# Patient Record
Sex: MALE | Race: BLACK OR AFRICAN AMERICAN | Employment: OTHER | URBAN - METROPOLITAN AREA
[De-identification: names, ages, dates, MRNs, and addresses within clinical notes are randomized per-mention and may not be internally consistent; named-entity substitution may affect disease eponyms.]

---

## 2022-02-10 ENCOUNTER — OFFICE VISIT (OUTPATIENT)
Dept: FAMILY MEDICINE CLINIC | Age: 42
End: 2022-02-10
Payer: OTHER GOVERNMENT

## 2022-02-10 VITALS
HEIGHT: 74 IN | TEMPERATURE: 97.1 F | OXYGEN SATURATION: 97 % | BODY MASS INDEX: 36.37 KG/M2 | WEIGHT: 283.4 LBS | HEART RATE: 102 BPM | DIASTOLIC BLOOD PRESSURE: 78 MMHG | SYSTOLIC BLOOD PRESSURE: 100 MMHG

## 2022-02-10 DIAGNOSIS — Z11.59 ENCOUNTER FOR HEPATITIS C SCREENING TEST FOR LOW RISK PATIENT: ICD-10-CM

## 2022-02-10 DIAGNOSIS — N52.9 ERECTILE DYSFUNCTION, UNSPECIFIED ERECTILE DYSFUNCTION TYPE: ICD-10-CM

## 2022-02-10 DIAGNOSIS — Z00.00 ENCOUNTER FOR WELLNESS EXAMINATION IN ADULT: ICD-10-CM

## 2022-02-10 DIAGNOSIS — Z13.220 SCREENING CHOLESTEROL LEVEL: Primary | ICD-10-CM

## 2022-02-10 DIAGNOSIS — Z83.3 FAMILY HISTORY OF DIABETES MELLITUS: ICD-10-CM

## 2022-02-10 DIAGNOSIS — E66.09 CLASS 2 OBESITY DUE TO EXCESS CALORIES WITHOUT SERIOUS COMORBIDITY WITH BODY MASS INDEX (BMI) OF 36.0 TO 36.9 IN ADULT: ICD-10-CM

## 2022-02-10 PROCEDURE — 99386 PREV VISIT NEW AGE 40-64: CPT | Performed by: NURSE PRACTITIONER

## 2022-02-10 RX ORDER — SILDENAFIL 100 MG/1
TABLET, FILM COATED ORAL
Qty: 30 TABLET | Refills: 1 | Status: SHIPPED | OUTPATIENT
Start: 2022-02-10 | End: 2022-02-25 | Stop reason: SDUPTHER

## 2022-02-10 RX ORDER — AMITRIPTYLINE HYDROCHLORIDE 10 MG/1
TABLET, FILM COATED ORAL
COMMUNITY
End: 2022-02-10 | Stop reason: SDUPTHER

## 2022-02-10 RX ORDER — AMITRIPTYLINE HYDROCHLORIDE 10 MG/1
10 TABLET, FILM COATED ORAL
Qty: 180 TABLET | Refills: 1 | Status: SHIPPED | OUTPATIENT
Start: 2022-02-10

## 2022-02-10 RX ORDER — SILDENAFIL 100 MG/1
TABLET, FILM COATED ORAL
COMMUNITY
End: 2022-02-10 | Stop reason: SDUPTHER

## 2022-02-10 NOTE — PROGRESS NOTES
No chief complaint on file. 1. Have you been to the ER, urgent care clinic since your last visit? Hospitalized since your last visit? No    2. Have you seen or consulted any other health care providers outside of the [address] [place] [address] since your last visit? Include any pap smears or colon screening.  No   Visit Vitals  /78 (BP 1 Location: Left upper arm, BP Patient Position: Sitting, BP Cuff Size: Adult)   Pulse (!) 102   Temp 97.1 °F (36.2 °C) (Temporal)   Ht 6' 1.8\" (1.875 m)   Wt 283 lb 6.4 oz (128.5 kg)   SpO2 97%   BMI 36.58 kg/m²         3 most recent PHQ Screens 2/10/2022   Little interest or pleasure in doing things Not at all   Feeling down, depressed, irritable, or hopeless Not at all   Total Score PHQ 2 0

## 2022-02-11 LAB
ALBUMIN SERPL-MCNC: 4.3 G/DL (ref 4–5)
ALBUMIN/GLOB SERPL: 1.7 {RATIO} (ref 1.2–2.2)
ALP SERPL-CCNC: 63 IU/L (ref 44–121)
ALT SERPL-CCNC: 22 IU/L (ref 0–44)
AST SERPL-CCNC: 20 IU/L (ref 0–40)
BASOPHILS # BLD AUTO: 0.1 X10E3/UL (ref 0–0.2)
BASOPHILS NFR BLD AUTO: 1 %
BILIRUB SERPL-MCNC: 0.3 MG/DL (ref 0–1.2)
BUN SERPL-MCNC: 11 MG/DL (ref 6–24)
BUN/CREAT SERPL: 10 (ref 9–20)
CALCIUM SERPL-MCNC: 9.5 MG/DL (ref 8.7–10.2)
CHLORIDE SERPL-SCNC: 104 MMOL/L (ref 96–106)
CHOLEST SERPL-MCNC: 181 MG/DL (ref 100–199)
CO2 SERPL-SCNC: 20 MMOL/L (ref 20–29)
CREAT SERPL-MCNC: 1.15 MG/DL (ref 0.76–1.27)
EOSINOPHIL # BLD AUTO: 0.2 X10E3/UL (ref 0–0.4)
EOSINOPHIL NFR BLD AUTO: 2 %
ERYTHROCYTE [DISTWIDTH] IN BLOOD BY AUTOMATED COUNT: 13.6 % (ref 11.6–15.4)
EST. AVERAGE GLUCOSE BLD GHB EST-MCNC: 128 MG/DL
GLOBULIN SER CALC-MCNC: 2.6 G/DL (ref 1.5–4.5)
GLUCOSE SERPL-MCNC: 114 MG/DL (ref 65–99)
HBA1C MFR BLD: 6.1 % (ref 4.8–5.6)
HCT VFR BLD AUTO: 48.8 % (ref 37.5–51)
HCV AB S/CO SERPL IA: <0.1 S/CO RATIO (ref 0–0.9)
HDLC SERPL-MCNC: 37 MG/DL
HGB BLD-MCNC: 15.9 G/DL (ref 13–17.7)
IMM GRANULOCYTES # BLD AUTO: 0 X10E3/UL (ref 0–0.1)
IMM GRANULOCYTES NFR BLD AUTO: 0 %
LDLC SERPL CALC-MCNC: 121 MG/DL (ref 0–99)
LYMPHOCYTES # BLD AUTO: 3.1 X10E3/UL (ref 0.7–3.1)
LYMPHOCYTES NFR BLD AUTO: 30 %
MCH RBC QN AUTO: 28.1 PG (ref 26.6–33)
MCHC RBC AUTO-ENTMCNC: 32.6 G/DL (ref 31.5–35.7)
MCV RBC AUTO: 86 FL (ref 79–97)
MONOCYTES # BLD AUTO: 0.9 X10E3/UL (ref 0.1–0.9)
MONOCYTES NFR BLD AUTO: 9 %
NEUTROPHILS # BLD AUTO: 6 X10E3/UL (ref 1.4–7)
NEUTROPHILS NFR BLD AUTO: 58 %
PLATELET # BLD AUTO: 306 X10E3/UL (ref 150–450)
POTASSIUM SERPL-SCNC: 3.9 MMOL/L (ref 3.5–5.2)
PROT SERPL-MCNC: 6.9 G/DL (ref 6–8.5)
RBC # BLD AUTO: 5.66 X10E6/UL (ref 4.14–5.8)
SODIUM SERPL-SCNC: 143 MMOL/L (ref 134–144)
TRIGL SERPL-MCNC: 128 MG/DL (ref 0–149)
VLDLC SERPL CALC-MCNC: 23 MG/DL (ref 5–40)
WBC # BLD AUTO: 10.4 X10E3/UL (ref 3.4–10.8)

## 2022-02-21 PROBLEM — Z13.220 SCREENING CHOLESTEROL LEVEL: Status: ACTIVE | Noted: 2022-02-21

## 2022-02-21 PROBLEM — Z83.3 FAMILY HISTORY OF DIABETES MELLITUS: Status: ACTIVE | Noted: 2022-02-21

## 2022-02-21 PROBLEM — Z11.59 ENCOUNTER FOR HEPATITIS C SCREENING TEST FOR LOW RISK PATIENT: Status: ACTIVE | Noted: 2022-02-21

## 2022-02-21 PROBLEM — N52.9 ERECTILE DYSFUNCTION: Status: ACTIVE | Noted: 2022-02-21

## 2022-02-21 NOTE — PROGRESS NOTES
Subjective  Chief Complaint   Patient presents with    New Patient    Establish Care     HPI:  Pili Coronado is a 38 y.o. male. 39 yo male presents as a new patient and as a new patient I will be conducting his wellness. He is also in need of medication refills    Past Medical History:   Diagnosis Date    Chronic uveitis     Neuropathy      Family History   Problem Relation Age of Onset    Diabetes Mother     Cancer Mother     Hypertension Mother     Diabetes Sister     Hypertension Sister     Hypertension Brother      Social History     Socioeconomic History    Marital status:      Spouse name: Not on file    Number of children: Not on file    Years of education: Not on file    Highest education level: Not on file   Occupational History    Not on file   Tobacco Use    Smoking status: Current Every Day Smoker     Packs/day: 0.50     Years: 25.00     Pack years: 12.50     Types: Cigarettes    Smokeless tobacco: Never Used   Vaping Use    Vaping Use: Never used   Substance and Sexual Activity    Alcohol use: Yes     Comment: rare    Drug use: Never    Sexual activity: Not on file   Other Topics Concern    Not on file   Social History Narrative    Not on file     Social Determinants of Health     Financial Resource Strain:     Difficulty of Paying Living Expenses: Not on file   Food Insecurity:     Worried About [address] in the Last Year: Not on file    [place] of Food in the Last Year: Not on file   Transportation Needs:     Lack of Transportation (Medical): Not on file    Lack of Transportation (Non-Medical):  Not on file   Physical Activity:     Days of Exercise per Week: Not on file    Minutes of Exercise per Session: Not on file   Stress:     Feeling of Stress : Not on file   Social Connections:     Frequency of Communication with Friends and Family: Not on file    Frequency of Social Gatherings with Friends and Family: Not on file    Attends Restorationism Services: Not on file    Active Member of Clubs or Organizations: Not on file    Attends Club or Organization Meetings: Not on file    Marital Status: Not on file   Intimate Partner Violence:     Fear of Current or Ex-Partner: Not on file    Emotionally Abused: Not on file    Physically Abused: Not on file    Sexually Abused: Not on file   Housing Stability:     Unable to Pay for Housing in the Last Year: Not on file    Number of [place] in the Last Year: Not on file    Unstable Housing in the Last Year: Not on file     Current Outpatient Medications on File Prior to Visit   Medication Sig Dispense Refill    infliximab (REMICADE IV) by IntraVENous route. No current facility-administered medications on file prior to visit. Allergies   Allergen Reactions    Clams [Shellfish Containing Products] Anaphylaxis and Itching    Iodine Rash     ROS   ROS per HPI and PMH      Objective  Physical Exam  Vitals reviewed. Cardiovascular:      Heart sounds: Normal heart sounds. Pulmonary:      Effort: Pulmonary effort is normal.      Breath sounds: Normal breath sounds. Abdominal:      General: Bowel sounds are normal.      Palpations: Abdomen is soft. Skin:     General: Skin is warm and dry. Neurological:      Mental Status: He is alert and oriented to person, place, and time. Psychiatric:         Mood and Affect: Mood normal.         Behavior: Behavior normal.         Thought Content: Thought content normal.         Judgment: Judgment normal.          Assessment & Plan      ICD-10-CM ICD-9-CM    1. Screening cholesterol level  H65.123 W82.65 METABOLIC PANEL, COMPREHENSIVE   2.  Family history of diabetes mellitus  Z83.3 V18.0 HEMOGLOBIN A1C WITH EAG   3. Class 2 obesity due to excess calories without serious comorbidity with body mass index (BMI) of 36.0 to 36.9 in adult  E66.09 278.00 CBC WITH AUTOMATED DIFF    Z68.36 V85.36 LIPID PANEL   4. Encounter for hepatitis C screening test for low risk patient  Z11.59 V73.89 HEPATITIS C AB   5. Erectile dysfunction, unspecified erectile dysfunction type  N52.9 607.84    6. Encounter for wellness examination in adult  Z00.00 V70.0      Diagnoses and all orders for this visit:    1. Screening cholesterol level  -     METABOLIC PANEL, COMPREHENSIVE  Obtaining baseline CMP and will make treatment decisions when I get the results      2. Family history of diabetes mellitus  -     HEMOGLOBIN A1C WITH EAG  Obtaining baseline A1C and will make treatment decisions when I get the results      3. Class 2 obesity due to excess calories without serious comorbidity with body mass index (BMI) of 36.0 to 36.9 in adult  -     CBC WITH AUTOMATED DIFF  -     LIPID PANEL  Obtaining baseline CBC and Lipid and will make treatment decisions when I get the results    4. Encounter for hepatitis C screening test for low risk patient  -     HEPATITIS C AB  Screening for hep C and will confirm positive with additional lab testing and will also refer to GI for further evaluation and treatment    5. Erectile dysfunction, unspecified erectile dysfunction type  Patient takes generic viagra for this condition and feels that it works well. Will continue with use of this medication    6. Encounter for wellness examination in adult  We are making sure that his health screenings are done in  Timely fashion. They are as documented in the EMR    Other orders  -     amitriptyline (ELAVIL) 10 mg tablet; Take 1 Tablet by mouth nightly. Follow-up and Dispositions    · Return in about 6 months (around 8/10/2022) for f/u of chronic conditions with fasting labs.        Ranulfo Magaña NP

## 2022-02-25 ENCOUNTER — PATIENT MESSAGE (OUTPATIENT)
Dept: FAMILY MEDICINE CLINIC | Age: 42
End: 2022-02-25

## 2022-02-25 RX ORDER — SILDENAFIL 100 MG/1
TABLET, FILM COATED ORAL
Qty: 30 TABLET | Refills: 1 | Status: SHIPPED | OUTPATIENT
Start: 2022-02-25

## 2022-02-25 NOTE — TELEPHONE ENCOUNTER
From: Foster Robins  To: Rey Oropeza NP  Sent: 2/25/2022 9:41 AM EST  Subject: Prescription     Good Morning,   I will need my prescription for sildenafil to go to [facility] [place], [address]. Please let me know when this prescription has been sent.       Foster Robins   [phone]

## 2022-03-01 NOTE — PROGRESS NOTES
Your hemoglobin A1C indicates that you are prediabetic. I would like to try lifestyle changes first such as:  Patient instructed to adhere to portion sizes. Avoid the intake of concentrated sweets such as cakes, pies and cookies  Avoid intake of soft drinks (even diet soft drinks)  Increase intake of water  Increase intake of fiber  Lose weight  Exercise at least 30 minutes most days of the week. One of your bad cholesterols is also slightly elevated but not enough to start meds. I would suggest:  Decrease your intake of red meat and increase your intake of fatty fish such as salmon, mackerel and sardines. Increase fiber in your diet. Decrease your intake of full fat dairy products such as milk and yogurt and try to incorporate at least 30 minutes of exercise most days of the week and losing weight helps everything. Your other labs are basically normal.  Some values may be minimally outside the  \"normal\" range but are not harmful or clinically significant. Please contact the office if you have questions or concerns.   We will recheck all your labs at your next office visit

## 2022-03-18 PROBLEM — Z11.59 ENCOUNTER FOR HEPATITIS C SCREENING TEST FOR LOW RISK PATIENT: Status: ACTIVE | Noted: 2022-02-21

## 2022-03-19 PROBLEM — N52.9 ERECTILE DYSFUNCTION: Status: ACTIVE | Noted: 2022-02-21

## 2022-03-19 PROBLEM — Z83.3 FAMILY HISTORY OF DIABETES MELLITUS: Status: ACTIVE | Noted: 2022-02-21

## 2022-03-23 PROBLEM — Z11.59 ENCOUNTER FOR HEPATITIS C SCREENING TEST FOR LOW RISK PATIENT: Status: RESOLVED | Noted: 2022-02-21 | Resolved: 2022-03-23

## 2022-07-19 ENCOUNTER — APPOINTMENT (OUTPATIENT)
Dept: GENERAL RADIOLOGY | Age: 42
End: 2022-07-19
Attending: EMERGENCY MEDICINE
Payer: OTHER GOVERNMENT

## 2022-07-19 ENCOUNTER — HOSPITAL ENCOUNTER (EMERGENCY)
Age: 42
Discharge: HOME | End: 2022-07-19
Attending: EMERGENCY MEDICINE
Payer: OTHER GOVERNMENT

## 2022-07-19 VITALS
OXYGEN SATURATION: 96 % | WEIGHT: 270 LBS | HEIGHT: 74 IN | TEMPERATURE: 102.4 F | DIASTOLIC BLOOD PRESSURE: 74 MMHG | BODY MASS INDEX: 34.65 KG/M2 | RESPIRATION RATE: 24 BRPM | SYSTOLIC BLOOD PRESSURE: 132 MMHG | HEART RATE: 117 BPM

## 2022-07-19 DIAGNOSIS — Z79.620 INFLIXIMAB (REMICADE) LONG-TERM USE: ICD-10-CM

## 2022-07-19 DIAGNOSIS — U07.1 COVID-19: Primary | ICD-10-CM

## 2022-07-19 LAB
ALBUMIN SERPL-MCNC: 3.6 G/DL (ref 3.5–5)
ALBUMIN/GLOB SERPL: 1.1 {RATIO} (ref 1.1–2.2)
ALP SERPL-CCNC: 59 U/L (ref 45–117)
ALT SERPL-CCNC: 29 U/L (ref 12–78)
ANION GAP SERPL CALC-SCNC: 7 MMOL/L (ref 5–15)
AST SERPL-CCNC: 16 U/L (ref 15–37)
ATRIAL RATE: 130 BPM
BASOPHILS # BLD: 0 K/UL (ref 0–0.1)
BASOPHILS NFR BLD: 0 % (ref 0–1)
BILIRUB SERPL-MCNC: 0.3 MG/DL (ref 0.2–1)
BUN SERPL-MCNC: 13 MG/DL (ref 6–20)
BUN/CREAT SERPL: 10 (ref 12–20)
CALCIUM SERPL-MCNC: 8.8 MG/DL (ref 8.5–10.1)
CALCULATED P AXIS, ECG09: 55 DEGREES
CALCULATED R AXIS, ECG10: 41 DEGREES
CALCULATED T AXIS, ECG11: 31 DEGREES
CHLORIDE SERPL-SCNC: 113 MMOL/L (ref 97–108)
CK SERPL-CCNC: 134 U/L (ref 39–308)
CO2 SERPL-SCNC: 21 MMOL/L (ref 21–32)
COMMENT, HOLDF: NORMAL
COVID-19 RAPID TEST, COVR: DETECTED
CREAT SERPL-MCNC: 1.29 MG/DL (ref 0.7–1.3)
DIAGNOSIS, 93000: NORMAL
DIFFERENTIAL METHOD BLD: ABNORMAL
EOSINOPHIL # BLD: 0.3 K/UL (ref 0–0.4)
EOSINOPHIL NFR BLD: 3 % (ref 0–7)
ERYTHROCYTE [DISTWIDTH] IN BLOOD BY AUTOMATED COUNT: 14 % (ref 11.5–14.5)
FLUAV AG NPH QL IA: NEGATIVE
FLUBV AG NOSE QL IA: NEGATIVE
GLOBULIN SER CALC-MCNC: 3.3 G/DL (ref 2–4)
GLUCOSE SERPL-MCNC: 131 MG/DL (ref 65–100)
HCT VFR BLD AUTO: 45.5 % (ref 36.6–50.3)
HGB BLD-MCNC: 15.4 G/DL (ref 12.1–17)
IMM GRANULOCYTES # BLD AUTO: 0 K/UL (ref 0–0.04)
IMM GRANULOCYTES NFR BLD AUTO: 0 % (ref 0–0.5)
LACTATE BLD-SCNC: 1.66 MMOL/L (ref 0.4–2)
LYMPHOCYTES # BLD: 0.4 K/UL (ref 0.8–3.5)
LYMPHOCYTES NFR BLD: 4 % (ref 12–49)
MCH RBC QN AUTO: 29.3 PG (ref 26–34)
MCHC RBC AUTO-ENTMCNC: 33.8 G/DL (ref 30–36.5)
MCV RBC AUTO: 86.5 FL (ref 80–99)
MONOCYTES # BLD: 0.8 K/UL (ref 0–1)
MONOCYTES NFR BLD: 9 % (ref 5–13)
NEUTS SEG # BLD: 7.4 K/UL (ref 1.8–8)
NEUTS SEG NFR BLD: 84 % (ref 32–75)
NRBC # BLD: 0 K/UL (ref 0–0.01)
NRBC BLD-RTO: 0 PER 100 WBC
P-R INTERVAL, ECG05: 150 MS
PLATELET # BLD AUTO: 242 K/UL (ref 150–400)
PMV BLD AUTO: 9.5 FL (ref 8.9–12.9)
POTASSIUM SERPL-SCNC: 3.8 MMOL/L (ref 3.5–5.1)
PROT SERPL-MCNC: 6.9 G/DL (ref 6.4–8.2)
Q-T INTERVAL, ECG07: 282 MS
QRS DURATION, ECG06: 82 MS
QTC CALCULATION (BEZET), ECG08: 415 MS
RBC # BLD AUTO: 5.26 M/UL (ref 4.1–5.7)
RBC MORPH BLD: ABNORMAL
SAMPLES BEING HELD,HOLD: NORMAL
SODIUM SERPL-SCNC: 141 MMOL/L (ref 136–145)
SOURCE, COVRS: ABNORMAL
VENTRICULAR RATE, ECG03: 130 BPM
WBC # BLD AUTO: 8.9 K/UL (ref 4.1–11.1)

## 2022-07-19 PROCEDURE — 74011250637 HC RX REV CODE- 250/637: Performed by: EMERGENCY MEDICINE

## 2022-07-19 PROCEDURE — 36415 COLL VENOUS BLD VENIPUNCTURE: CPT

## 2022-07-19 PROCEDURE — 85025 COMPLETE CBC W/AUTO DIFF WBC: CPT

## 2022-07-19 PROCEDURE — 71045 X-RAY EXAM CHEST 1 VIEW: CPT

## 2022-07-19 PROCEDURE — 87804 INFLUENZA ASSAY W/OPTIC: CPT

## 2022-07-19 PROCEDURE — 80053 COMPREHEN METABOLIC PANEL: CPT

## 2022-07-19 PROCEDURE — 74011000250 HC RX REV CODE- 250: Performed by: EMERGENCY MEDICINE

## 2022-07-19 PROCEDURE — 83605 ASSAY OF LACTIC ACID: CPT

## 2022-07-19 PROCEDURE — 87040 BLOOD CULTURE FOR BACTERIA: CPT

## 2022-07-19 PROCEDURE — 82550 ASSAY OF CK (CPK): CPT

## 2022-07-19 PROCEDURE — 99285 EMERGENCY DEPT VISIT HI MDM: CPT

## 2022-07-19 PROCEDURE — 93005 ELECTROCARDIOGRAM TRACING: CPT

## 2022-07-19 PROCEDURE — 74011250636 HC RX REV CODE- 250/636: Performed by: EMERGENCY MEDICINE

## 2022-07-19 PROCEDURE — 87635 SARS-COV-2 COVID-19 AMP PRB: CPT

## 2022-07-19 RX ORDER — NIRMATRELVIR AND RITONAVIR 300-100 MG
3 KIT ORAL EVERY 12 HOURS
Qty: 1 BOX | Refills: 0 | Status: SHIPPED | OUTPATIENT
Start: 2022-07-19 | End: 2022-07-24

## 2022-07-19 RX ORDER — ACETAMINOPHEN 500 MG
1000 TABLET ORAL
Qty: 20 TABLET | Refills: 0 | Status: SHIPPED | OUTPATIENT
Start: 2022-07-19

## 2022-07-19 RX ORDER — SODIUM CHLORIDE 0.9 % (FLUSH) 0.9 %
5-10 SYRINGE (ML) INJECTION AS NEEDED
Status: DISCONTINUED | OUTPATIENT
Start: 2022-07-19 | End: 2022-07-19 | Stop reason: HOSPADM

## 2022-07-19 RX ORDER — IBUPROFEN 800 MG/1
800 TABLET ORAL
Qty: 20 TABLET | Refills: 0 | Status: SHIPPED | OUTPATIENT
Start: 2022-07-19 | End: 2022-07-26

## 2022-07-19 RX ORDER — IBUPROFEN 800 MG/1
800 TABLET ORAL
Status: COMPLETED | OUTPATIENT
Start: 2022-07-19 | End: 2022-07-19

## 2022-07-19 RX ADMIN — IBUPROFEN 800 MG: 800 TABLET, FILM COATED ORAL at 04:11

## 2022-07-19 RX ADMIN — SODIUM CHLORIDE 1000 ML: 9 INJECTION, SOLUTION INTRAVENOUS at 04:12

## 2022-07-19 RX ADMIN — SODIUM CHLORIDE, PRESERVATIVE FREE 10 ML: 5 INJECTION INTRAVENOUS at 04:13

## 2022-07-20 ENCOUNTER — PATIENT OUTREACH (OUTPATIENT)
Dept: CASE MANAGEMENT | Age: 42
End: 2022-07-20

## 2022-07-20 NOTE — PROGRESS NOTES
Patient contacted regarding COVID-19 risk, diagnosis. Discussed COVID-19 related testing which was available at this time. Test results were positive. Patient informed of results, if available? yes. LPN Care Coordinator contacted the patient by telephone to perform post discharge assessment. Call within 2 business days of discharge: Yes Verified name and  with patient as identifiers. Provided introduction to self, and explanation of the CTN/ACM role, and reason for call due to risk factors for infection and/or exposure to COVID-19. Symptoms reviewed with patient who verbalized the following symptoms: fatigue, pain or aching joints, cough, and no new symptoms      Due to no new or worsening symptoms encounter was not routed to provider for escalation. Discussed follow-up appointments. If no appointment was previously scheduled, appointment scheduling offered:  no. [facility] follow up appointment(s): No future appointments. Non-[facility] follow up appointment(s): n/a    Interventions to address risk factors: Education of patient/family/caregiver/guardian to support self-management-VDH and covid numbers given     Advance Care Planning:   Does patient have an Advance Directive: not on file. Educated patient about risk for severe COVID-19 due to risk factors according to CDC guidelines. LPN CC reviewed discharge instructions, medical action plan and red flag symptoms with the patient who verbalized understanding. Discussed COVID vaccination status: no. Education provided on COVID-19 vaccination as appropriate. Discussed exposure protocols and quarantine with CDC Guidelines. Patient was given an opportunity to verbalize any questions and concerns and agrees to contact LPN CC or health care provider for questions related to their healthcare. Reviewed and educated patient on any new and changed medications related to discharge diagnosis     Was patient discharged with a pulse oximeter?  no    LPN CC provided contact information. Plan for follow-up call in 5-7 days based on severity of symptoms and risk factors.

## 2022-07-23 NOTE — ED PROVIDER NOTES
The history is provided by the patient. Fever   This is a new problem. The current episode started 12 to 24 hours ago. The problem occurs constantly. The problem has not changed since onset. The maximum temperature noted was 102 - 102.9 F. Associated symptoms include headaches, muscle aches and shortness of breath. He has tried acetaminophen for the symptoms. The treatment provided no relief. Shortness of Breath  This is a new problem. The problem occurs continuously. The current episode started 12 to 24 hours ago. The problem has not changed since onset. Associated symptoms include a fever and headaches. Pertinent negatives include no syncope. He has tried nothing for the symptoms. Associated medical issues comments: on remicaid for chronic uveitis. Headache   This is a new problem. The current episode started 12 to 24 hours ago. The problem occurs constantly. The problem has not changed since onset. The headache is aggravated by fever. Associated symptoms include a fever and shortness of breath. Pertinent negatives include no syncope and no visual change. He has tried acetaminophen for the symptoms.       Past Medical History:   Diagnosis Date    Chronic uveitis     Neuropathy        Past Surgical History:   Procedure Laterality Date    HX HIP ARTHROSCOPY      2x    HX OTHER SURGICAL      pinch nerves         Family History:   Problem Relation Age of Onset    Diabetes Mother     Cancer Mother     Hypertension Mother     Diabetes Sister     Hypertension Sister     Hypertension Brother        Social History     Socioeconomic History    Marital status:      Spouse name: Not on file    Number of children: Not on file    Years of education: Not on file    Highest education level: Not on file   Occupational History    Not on file   Tobacco Use    Smoking status: Every Day     Packs/day: 0.50     Years: 25.00     Pack years: 12.50     Types: Cigarettes    Smokeless tobacco: Never   Vaping Use    Vaping Use: Never used   Substance and Sexual Activity    Alcohol use: Yes     Comment: rare    Drug use: Never    Sexual activity: Not on file   Other Topics Concern    Not on file   Social History Narrative    Not on file     Social Determinants of Health     Financial Resource Strain: Not on file   Food Insecurity: Not on file   Transportation Needs: Not on file   Physical Activity: Not on file   Stress: Not on file   Social Connections: Not on file   Intimate Partner Violence: Not on file   Housing Stability: Not on file         ALLERGIES: Clams [shellfish containing products] and Iodine    Review of Systems   Constitutional:  Positive for fever. Respiratory:  Positive for shortness of breath. Cardiovascular:  Negative for syncope. Neurological:  Positive for headaches. All other systems reviewed and are negative. Vitals:    07/19/22 0340 07/19/22 0348 07/19/22 0500 07/19/22 0505   BP:   132/74    Pulse:  (!) 128 (!) 117    Resp:   24    Temp:   (!) 102.4 °F (39.1 °C)    SpO2:   95% 96%   Weight: 122.5 kg (270 lb)      Height: 6' 2\" (1.88 m)               Physical Exam  Vitals and nursing note reviewed. Constitutional:       General: He is not in acute distress. Appearance: He is well-developed. HENT:      Head: Normocephalic and atraumatic. Eyes:      Conjunctiva/sclera: Conjunctivae normal.   Neck:      Trachea: No tracheal deviation. Cardiovascular:      Rate and Rhythm: Regular rhythm. Tachycardia present. Pulmonary:      Effort: Pulmonary effort is normal. No respiratory distress. Breath sounds: Normal breath sounds. Abdominal:      General: There is no distension. Musculoskeletal:         General: No deformity. Normal range of motion. Cervical back: Neck supple. Skin:     General: Skin is warm and dry. Neurological:      Mental Status: He is alert. Cranial Nerves: No cranial nerve deficit.    Psychiatric:         Behavior: Behavior normal.        [facility]    ED Course as of 07/23/22 0727   Tue Jul 19, 2022   0355 EKG 0353: Rate 130, sinus tachycardia, No ST segment or T wave abnormalities. Otherwise normal EKG. [DK]      ED Course User Index  [DK] Francisco Benavidez MD     38 y.o. male presents with typical cvid symptoms starting with high fever today. Vaccinated but complicated by immunosuppressive therapy. Positive for covid here, no airspace disease or concomitant infection. he is an appropriate candidate for paxlovid treatment. Provided prescription for home. Advised on optimal use of motrin and tylenol for fever or symptomatic control. Plan to follow up with PCP as needed and return precautions discussed for worsening or new concerning symptoms.      Procedures

## 2022-07-24 LAB
BACTERIA SPEC CULT: NORMAL
SERVICE CMNT-IMP: NORMAL

## 2022-07-28 ENCOUNTER — PATIENT OUTREACH (OUTPATIENT)
Dept: CASE MANAGEMENT | Age: 42
End: 2022-07-28

## 2022-07-28 NOTE — PROGRESS NOTES
Patient resolved from Transition of Care episode on 7/28/22. ACM/CTN was unsuccessful at contacting this patient today. Patient/family was provided the following resources and education related to COVID-19 during the initial call:                         Signs, symptoms and red flags related to COVID-19            CDC exposure and quarantine guidelines            [facility] exposure contact - [phone]            Contact for their local Department of Health                 Patient has not had any additional ED or hospital visits. No further outreach scheduled with this CTN/ACM. Episode of Care resolved. Patient has this CTN/ACM contact information if future needs arise.

## 2022-08-24 ENCOUNTER — PATIENT MESSAGE (OUTPATIENT)
Dept: FAMILY MEDICINE CLINIC | Age: 42
End: 2022-08-24

## 2022-08-24 DIAGNOSIS — L02.31 CELLULITIS AND ABSCESS OF BUTTOCK: Primary | ICD-10-CM

## 2022-08-24 DIAGNOSIS — L03.317 CELLULITIS AND ABSCESS OF BUTTOCK: Primary | ICD-10-CM

## 2022-08-24 RX ORDER — SULFAMETHOXAZOLE AND TRIMETHOPRIM 800; 160 MG/1; MG/1
1 TABLET ORAL 2 TIMES DAILY
Qty: 20 TABLET | Refills: 0 | Status: SHIPPED | OUTPATIENT
Start: 2022-08-24 | End: 2022-09-03

## 2022-08-24 NOTE — TELEPHONE ENCOUNTER
From: Selina Del Rosario  To: Jaylin Edwards NP  Sent: 8/24/2022 3:08 PM EDT  Subject: Referral Needed    Good afternoon,     I have a boil/hair bump that is painful on my buttock and need a referral to see a dermatologist if possible?       Selina Del Rosario  [phone]

## 2022-08-24 NOTE — TELEPHONE ENCOUNTER
I will give you a referral to a dermatologist but they are booked out so I will also call in an antibiotic to see if we can impact it until you can be seen.   I am referring you to [facility] [address] office ,  The [address] [facility], [address], [place], [place]    Phone: [phone]

## 2023-03-15 ENCOUNTER — TELEPHONE (OUTPATIENT)
Dept: FAMILY MEDICINE CLINIC | Age: 43
End: 2023-03-15

## 2023-03-16 ENCOUNTER — HOSPITAL ENCOUNTER (INPATIENT)
Age: 43
LOS: 1 days | Discharge: HOME | End: 2023-03-17
Attending: EMERGENCY MEDICINE | Admitting: STUDENT IN AN ORGANIZED HEALTH CARE EDUCATION/TRAINING PROGRAM
Payer: OTHER GOVERNMENT

## 2023-03-16 ENCOUNTER — APPOINTMENT (OUTPATIENT)
Dept: VASCULAR SURGERY | Age: 43
End: 2023-03-16
Attending: PHYSICIAN ASSISTANT
Payer: OTHER GOVERNMENT

## 2023-03-16 ENCOUNTER — APPOINTMENT (OUTPATIENT)
Dept: NON INVASIVE DIAGNOSTICS | Age: 43
End: 2023-03-16
Attending: STUDENT IN AN ORGANIZED HEALTH CARE EDUCATION/TRAINING PROGRAM
Payer: OTHER GOVERNMENT

## 2023-03-16 ENCOUNTER — APPOINTMENT (OUTPATIENT)
Dept: CT IMAGING | Age: 43
End: 2023-03-16
Attending: PHYSICIAN ASSISTANT
Payer: OTHER GOVERNMENT

## 2023-03-16 ENCOUNTER — APPOINTMENT (OUTPATIENT)
Dept: GENERAL RADIOLOGY | Age: 43
End: 2023-03-16
Attending: EMERGENCY MEDICINE
Payer: OTHER GOVERNMENT

## 2023-03-16 DIAGNOSIS — R91.1 PULMONARY NODULE, RIGHT: ICD-10-CM

## 2023-03-16 DIAGNOSIS — R06.02 SHORTNESS OF BREATH: ICD-10-CM

## 2023-03-16 DIAGNOSIS — I26.99 BILATERAL PULMONARY EMBOLISM (HCC): Primary | ICD-10-CM

## 2023-03-16 DIAGNOSIS — I51.7 ENLARGED HEART: ICD-10-CM

## 2023-03-16 LAB
ALBUMIN SERPL-MCNC: 3.7 G/DL (ref 3.5–5)
ALBUMIN/GLOB SERPL: 1 (ref 1.1–2.2)
ALP SERPL-CCNC: 58 U/L (ref 45–117)
ALT SERPL-CCNC: 19 U/L (ref 12–78)
ANION GAP SERPL CALC-SCNC: 7 MMOL/L (ref 5–15)
AST SERPL-CCNC: 7 U/L (ref 15–37)
BASOPHILS # BLD: 0.1 K/UL (ref 0–0.1)
BASOPHILS NFR BLD: 0 % (ref 0–1)
BILIRUB SERPL-MCNC: 0.4 MG/DL (ref 0.2–1)
BUN SERPL-MCNC: 13 MG/DL (ref 6–20)
BUN/CREAT SERPL: 10 (ref 12–20)
CALCIUM SERPL-MCNC: 9.3 MG/DL (ref 8.5–10.1)
CHLORIDE SERPL-SCNC: 111 MMOL/L (ref 97–108)
CO2 SERPL-SCNC: 22 MMOL/L (ref 21–32)
COMMENT, HOLDF: NORMAL
CREAT SERPL-MCNC: 1.27 MG/DL (ref 0.7–1.3)
DIFFERENTIAL METHOD BLD: ABNORMAL
ECHO AO ASC DIAM: 3.2 CM
ECHO AO ASCENDING AORTA INDEX: 1.29 CM/M2
ECHO AV AREA PEAK VELOCITY: 3.1 CM2
ECHO AV AREA VTI: 3.4 CM2
ECHO AV AREA/BSA PEAK VELOCITY: 1.2 CM2/M2
ECHO AV AREA/BSA VTI: 1.4 CM2/M2
ECHO AV MEAN GRADIENT: 4 MMHG
ECHO AV MEAN VELOCITY: 1 M/S
ECHO AV PEAK GRADIENT: 8 MMHG
ECHO AV PEAK VELOCITY: 1.4 M/S
ECHO AV VELOCITY RATIO: 0.93
ECHO AV VTI: 22.1 CM
ECHO LA DIAMETER INDEX: 1.41 CM/M2
ECHO LA DIAMETER: 3.5 CM
ECHO LA VOL 2C: 73 ML (ref 18–58)
ECHO LA VOL 4C: 74 ML (ref 18–58)
ECHO LA VOL BP: 77 ML (ref 18–58)
ECHO LA VOL/BSA BIPLANE: 31 ML/M2 (ref 16–34)
ECHO LA VOLUME AREA LENGTH: 83 ML
ECHO LA VOLUME INDEX A2C: 29 ML/M2 (ref 16–34)
ECHO LA VOLUME INDEX A4C: 30 ML/M2 (ref 16–34)
ECHO LA VOLUME INDEX AREA LENGTH: 33 ML/M2 (ref 16–34)
ECHO LV E' LATERAL VELOCITY: 8 CM/S
ECHO LV E' SEPTAL VELOCITY: 6 CM/S
ECHO LV EDV A2C: 128 ML
ECHO LV EDV A4C: 132 ML
ECHO LV EDV BP: 133 ML (ref 67–155)
ECHO LV EDV INDEX A4C: 53 ML/M2
ECHO LV EDV INDEX BP: 53 ML/M2
ECHO LV EDV NDEX A2C: 51 ML/M2
ECHO LV EJECTION FRACTION A2C: 58 %
ECHO LV EJECTION FRACTION A4C: 66 %
ECHO LV EJECTION FRACTION BIPLANE: 63 % (ref 55–100)
ECHO LV ESV A2C: 54 ML
ECHO LV ESV A4C: 45 ML
ECHO LV ESV BP: 49 ML (ref 22–58)
ECHO LV ESV INDEX A2C: 22 ML/M2
ECHO LV ESV INDEX A4C: 18 ML/M2
ECHO LV ESV INDEX BP: 20 ML/M2
ECHO LV FRACTIONAL SHORTENING: 41 % (ref 28–44)
ECHO LV INTERNAL DIMENSION DIASTOLE INDEX: 2.17 CM/M2
ECHO LV INTERNAL DIMENSION DIASTOLIC: 5.4 CM (ref 4.2–5.9)
ECHO LV INTERNAL DIMENSION SYSTOLIC INDEX: 1.29 CM/M2
ECHO LV INTERNAL DIMENSION SYSTOLIC: 3.2 CM
ECHO LV IVSD: 1 CM (ref 0.6–1)
ECHO LV MASS 2D: 220.6 G (ref 88–224)
ECHO LV MASS INDEX 2D: 88.6 G/M2 (ref 49–115)
ECHO LV POSTERIOR WALL DIASTOLIC: 1.1 CM (ref 0.6–1)
ECHO LV RELATIVE WALL THICKNESS RATIO: 0.41
ECHO LVOT AREA: 3.5 CM2
ECHO LVOT AV VTI INDEX: 1
ECHO LVOT DIAM: 2.1 CM
ECHO LVOT MEAN GRADIENT: 4 MMHG
ECHO LVOT PEAK GRADIENT: 6 MMHG
ECHO LVOT PEAK VELOCITY: 1.3 M/S
ECHO LVOT STROKE VOLUME INDEX: 30.6 ML/M2
ECHO LVOT SV: 76.2 ML
ECHO LVOT VTI: 22 CM
ECHO MV A VELOCITY: 0.51 M/S
ECHO MV E DECELERATION TIME (DT): 201.6 MS
ECHO MV E VELOCITY: 0.72 M/S
ECHO MV E/A RATIO: 1.41
ECHO MV E/E' LATERAL: 9
ECHO MV E/E' RATIO (AVERAGED): 10.5
ECHO MV E/E' SEPTAL: 12
ECHO PV MAX VELOCITY: 1 M/S
ECHO PV MEAN GRADIENT: 2 MMHG
ECHO PV MEAN VELOCITY: 0.7 M/S
ECHO PV PEAK GRADIENT: 4 MMHG
ECHO RV INTERNAL DIMENSION: 3.6 CM
ECHO RV TAPSE: 2.9 CM (ref 1.7–?)
ECHO RVOT MEAN GRADIENT: 1 MMHG
ECHO RVOT PEAK GRADIENT: 2 MMHG
ECHO RVOT PEAK VELOCITY: 0.7 M/S
ECHO RVOT VTI: 14.8 CM
ECHO TV REGURGITANT MAX VELOCITY: 1.61 M/S
ECHO TV REGURGITANT PEAK GRADIENT: 11 MMHG
EOSINOPHIL # BLD: 0.3 K/UL (ref 0–0.4)
EOSINOPHIL NFR BLD: 3 % (ref 0–7)
ERYTHROCYTE [DISTWIDTH] IN BLOOD BY AUTOMATED COUNT: 14.3 % (ref 11.5–14.5)
GLOBULIN SER CALC-MCNC: 3.7 G/DL (ref 2–4)
GLUCOSE SERPL-MCNC: 156 MG/DL (ref 65–100)
HCT VFR BLD AUTO: 46 % (ref 36.6–50.3)
HGB BLD-MCNC: 15.9 G/DL (ref 12.1–17)
IMM GRANULOCYTES # BLD AUTO: 0 K/UL (ref 0–0.04)
IMM GRANULOCYTES NFR BLD AUTO: 0 % (ref 0–0.5)
LIPASE SERPL-CCNC: 225 U/L (ref 73–393)
LYMPHOCYTES # BLD: 2.6 K/UL (ref 0.8–3.5)
LYMPHOCYTES NFR BLD: 21 % (ref 12–49)
MCH RBC QN AUTO: 29.4 PG (ref 26–34)
MCHC RBC AUTO-ENTMCNC: 34.6 G/DL (ref 30–36.5)
MCV RBC AUTO: 85 FL (ref 80–99)
MONOCYTES # BLD: 1.1 K/UL (ref 0–1)
MONOCYTES NFR BLD: 9 % (ref 5–13)
NEUTS SEG # BLD: 7.9 K/UL (ref 1.8–8)
NEUTS SEG NFR BLD: 67 % (ref 32–75)
NRBC # BLD: 0 K/UL (ref 0–0.01)
NRBC BLD-RTO: 0 PER 100 WBC
PLATELET # BLD AUTO: 259 K/UL (ref 150–400)
PMV BLD AUTO: 9.3 FL (ref 8.9–12.9)
POTASSIUM SERPL-SCNC: 3.8 MMOL/L (ref 3.5–5.1)
PROT SERPL-MCNC: 7.4 G/DL (ref 6.4–8.2)
RBC # BLD AUTO: 5.41 M/UL (ref 4.1–5.7)
SAMPLES BEING HELD,HOLD: NORMAL
SODIUM SERPL-SCNC: 140 MMOL/L (ref 136–145)
TROPONIN I SERPL HS-MCNC: 4 NG/L (ref 0–76)
WBC # BLD AUTO: 11.9 K/UL (ref 4.1–11.1)

## 2023-03-16 PROCEDURE — 99285 EMERGENCY DEPT VISIT HI MDM: CPT

## 2023-03-16 PROCEDURE — 74011250636 HC RX REV CODE- 250/636: Performed by: STUDENT IN AN ORGANIZED HEALTH CARE EDUCATION/TRAINING PROGRAM

## 2023-03-16 PROCEDURE — 85025 COMPLETE CBC W/AUTO DIFF WBC: CPT

## 2023-03-16 PROCEDURE — 74011000250 HC RX REV CODE- 250: Performed by: STUDENT IN AN ORGANIZED HEALTH CARE EDUCATION/TRAINING PROGRAM

## 2023-03-16 PROCEDURE — 71275 CT ANGIOGRAPHY CHEST: CPT

## 2023-03-16 PROCEDURE — 65270000046 HC RM TELEMETRY

## 2023-03-16 PROCEDURE — 83690 ASSAY OF LIPASE: CPT

## 2023-03-16 PROCEDURE — 84484 ASSAY OF TROPONIN QUANT: CPT

## 2023-03-16 PROCEDURE — 74011250636 HC RX REV CODE- 250/636: Performed by: PHYSICIAN ASSISTANT

## 2023-03-16 PROCEDURE — 74011000636 HC RX REV CODE- 636: Performed by: EMERGENCY MEDICINE

## 2023-03-16 PROCEDURE — 93306 TTE W/DOPPLER COMPLETE: CPT | Performed by: INTERNAL MEDICINE

## 2023-03-16 PROCEDURE — 93005 ELECTROCARDIOGRAM TRACING: CPT

## 2023-03-16 PROCEDURE — 71046 X-RAY EXAM CHEST 2 VIEWS: CPT

## 2023-03-16 PROCEDURE — 93306 TTE W/DOPPLER COMPLETE: CPT

## 2023-03-16 PROCEDURE — 96374 THER/PROPH/DIAG INJ IV PUSH: CPT

## 2023-03-16 PROCEDURE — 93971 EXTREMITY STUDY: CPT

## 2023-03-16 PROCEDURE — 96375 TX/PRO/DX INJ NEW DRUG ADDON: CPT

## 2023-03-16 PROCEDURE — 96372 THER/PROPH/DIAG INJ SC/IM: CPT

## 2023-03-16 PROCEDURE — 74011250637 HC RX REV CODE- 250/637: Performed by: PHYSICIAN ASSISTANT

## 2023-03-16 PROCEDURE — 74011000250 HC RX REV CODE- 250: Performed by: PHYSICIAN ASSISTANT

## 2023-03-16 PROCEDURE — 74011250637 HC RX REV CODE- 250/637: Performed by: STUDENT IN AN ORGANIZED HEALTH CARE EDUCATION/TRAINING PROGRAM

## 2023-03-16 PROCEDURE — 94762 N-INVAS EAR/PLS OXIMTRY CONT: CPT

## 2023-03-16 PROCEDURE — 36415 COLL VENOUS BLD VENIPUNCTURE: CPT

## 2023-03-16 PROCEDURE — 80053 COMPREHEN METABOLIC PANEL: CPT

## 2023-03-16 RX ORDER — ENOXAPARIN SODIUM 150 MG/ML
1 INJECTION SUBCUTANEOUS EVERY 12 HOURS
Status: DISCONTINUED | OUTPATIENT
Start: 2023-03-16 | End: 2023-03-17

## 2023-03-16 RX ORDER — SODIUM CHLORIDE 0.9 % (FLUSH) 0.9 %
5-40 SYRINGE (ML) INJECTION EVERY 8 HOURS
Status: DISCONTINUED | OUTPATIENT
Start: 2023-03-16 | End: 2023-03-17 | Stop reason: HOSPADM

## 2023-03-16 RX ORDER — ENOXAPARIN SODIUM 150 MG/ML
1 INJECTION SUBCUTANEOUS
Status: COMPLETED | OUTPATIENT
Start: 2023-03-16 | End: 2023-03-16

## 2023-03-16 RX ORDER — ACETAMINOPHEN 650 MG/1
650 SUPPOSITORY RECTAL
Status: DISCONTINUED | OUTPATIENT
Start: 2023-03-16 | End: 2023-03-17 | Stop reason: HOSPADM

## 2023-03-16 RX ORDER — ACETAMINOPHEN 325 MG/1
650 TABLET ORAL
Status: DISCONTINUED | OUTPATIENT
Start: 2023-03-16 | End: 2023-03-17 | Stop reason: HOSPADM

## 2023-03-16 RX ORDER — SODIUM CHLORIDE 0.9 % (FLUSH) 0.9 %
5-40 SYRINGE (ML) INJECTION AS NEEDED
Status: DISCONTINUED | OUTPATIENT
Start: 2023-03-16 | End: 2023-03-17 | Stop reason: HOSPADM

## 2023-03-16 RX ORDER — DIPHENHYDRAMINE HYDROCHLORIDE 50 MG/ML
50 INJECTION, SOLUTION INTRAMUSCULAR; INTRAVENOUS ONCE
Status: COMPLETED | OUTPATIENT
Start: 2023-03-16 | End: 2023-03-16

## 2023-03-16 RX ORDER — KETOROLAC TROMETHAMINE 30 MG/ML
15 INJECTION, SOLUTION INTRAMUSCULAR; INTRAVENOUS
Status: COMPLETED | OUTPATIENT
Start: 2023-03-16 | End: 2023-03-16

## 2023-03-16 RX ADMIN — ACETAMINOPHEN 650 MG: 325 TABLET ORAL at 19:30

## 2023-03-16 RX ADMIN — SODIUM CHLORIDE, PRESERVATIVE FREE 10 ML: 5 INJECTION INTRAVENOUS at 21:50

## 2023-03-16 RX ADMIN — ENOXAPARIN SODIUM 120 MG: 150 INJECTION SUBCUTANEOUS at 10:25

## 2023-03-16 RX ADMIN — KETOROLAC TROMETHAMINE 15 MG: 30 INJECTION, SOLUTION INTRAMUSCULAR; INTRAVENOUS at 09:38

## 2023-03-16 RX ADMIN — METHYLPREDNISOLONE SODIUM SUCCINATE 40 MG: 40 INJECTION, POWDER, FOR SOLUTION INTRAMUSCULAR; INTRAVENOUS at 09:42

## 2023-03-16 RX ADMIN — DIPHENHYDRAMINE HYDROCHLORIDE 50 MG: 50 INJECTION, SOLUTION INTRAMUSCULAR; INTRAVENOUS at 12:27

## 2023-03-16 RX ADMIN — ENOXAPARIN SODIUM 120 MG: 150 INJECTION SUBCUTANEOUS at 21:50

## 2023-03-16 RX ADMIN — IOPAMIDOL 100 ML: 755 INJECTION, SOLUTION INTRAVENOUS at 12:15

## 2023-03-16 RX ADMIN — SODIUM CHLORIDE, PRESERVATIVE FREE 5 ML: 5 INJECTION INTRAVENOUS at 15:35

## 2023-03-16 RX ADMIN — ALUMINUM HYDROXIDE, MAGNESIUM HYDROXIDE, AND SIMETHICONE 40 ML: 200; 200; 20 SUSPENSION ORAL at 09:36

## 2023-03-16 RX ADMIN — SODIUM CHLORIDE 1000 ML: 9 INJECTION, SOLUTION INTRAVENOUS at 09:42

## 2023-03-16 NOTE — ED PROVIDER NOTES
37yo male with PMH chronic uveitis who presents with 1 week of L calf pain and shortness of breath x 3-4 days. Denies trauma, fall, F/C, N/V/D. Traveled on a cruise in January. Was symptom free until 1 week ago. Denies recent surgery or travel in the past 4 weeks, no hx of cancer. States he has a nodule on his lung and gets routine chest CTs to monitor this, last CT chest was 2-3 years ago and states he tolerated IV dye then but in the past did \"pass out\" once after receiving IV dye. This was [place] state, he is new to this area. Daughter did have blood clots possibly related to COVID. Denies hemoptysis, LE edema, fall/trauma.     Social hx- + tobacco, denies ETOH use       Past Medical History:   Diagnosis Date    Chronic uveitis     Neuropathy        Past Surgical History:   Procedure Laterality Date    HX HIP ARTHROSCOPY      2x    HX OTHER SURGICAL      pinch nerves         Family History:   Problem Relation Age of Onset    Diabetes Mother     Cancer Mother     Hypertension Mother     Diabetes Sister     Hypertension Sister     Hypertension Brother        Social History     Socioeconomic History    Marital status:      Spouse name: Not on file    Number of children: Not on file    Years of education: Not on file    Highest education level: Not on file   Occupational History    Not on file   Tobacco Use    Smoking status: Every Day     Packs/day: 0.50     Years: 25.00     Pack years: 12.50     Types: Cigarettes    Smokeless tobacco: Never   Vaping Use    Vaping Use: Never used   Substance and Sexual Activity    Alcohol use: Yes     Comment: rare    Drug use: Never    Sexual activity: Not on file   Other Topics Concern    Not on file   Social History Narrative    Not on file     Social Determinants of Health     Financial Resource Strain: Not on file   Food Insecurity: Not on file   Transportation Needs: Not on file   Physical Activity: Not on file   Stress: Not on file   Social Connections: Not on file Intimate Partner Violence: Not on file   Housing Stability: Not on file         ALLERGIES: Clams [shellfish containing products] and Iodine    Review of Systems   Constitutional: Negative. Negative for activity change, chills, fatigue and unexpected weight change. HENT:  Negative for trouble swallowing. Respiratory:  Positive for shortness of breath. Negative for cough, chest tightness and wheezing. Cardiovascular:  Positive for chest pain. Negative for palpitations. Gastrointestinal: Negative. Negative for abdominal pain, diarrhea, nausea and vomiting. Genitourinary: Negative. Negative for dysuria, flank pain, frequency and hematuria. Musculoskeletal: Negative. Negative for arthralgias, back pain, neck pain and neck stiffness. Skin: Negative. Negative for color change and rash. Neurological: Negative. Negative for dizziness, numbness and headaches. All other systems reviewed and are negative. There were no vitals filed for this visit. Physical Exam  Vitals and nursing note reviewed. Constitutional:       General: He is not in acute distress. Appearance: Normal appearance. He is well-developed. He is not toxic-appearing or diaphoretic. HENT:      Head: Normocephalic and atraumatic. Eyes:      General:         Right eye: No discharge. Left eye: No discharge. Conjunctiva/sclera: Conjunctivae normal.   Neck:      Trachea: No tracheal tenderness. Cardiovascular:      Rate and Rhythm: Regular rhythm. Tachycardia present. Pulses: Normal pulses. Heart sounds: Normal heart sounds. No murmur heard. No friction rub. No gallop. Pulmonary:      Effort: Pulmonary effort is normal. No respiratory distress. Breath sounds: Normal breath sounds. No wheezing or rales. Chest:      Chest wall: No tenderness. Abdominal:      General: Bowel sounds are normal. There is no distension. Palpations: Abdomen is soft. Tenderness:  There is no abdominal tenderness. There is no guarding or rebound. Musculoskeletal:         General: No tenderness. Normal range of motion. Cervical back: Full passive range of motion without pain and normal range of motion. Comments: BL calves with engorged varicose veins. L calf with central TTP and fullness. No pitting edema bilaterally. DP and PT pulses 2+ BL. Skin:     General: Skin is warm and dry. Capillary Refill: Capillary refill takes less than 2 seconds. Findings: No abrasion, erythema or rash. Neurological:      General: No focal deficit present. Mental Status: He is alert and oriented to person, place, and time. Cranial Nerves: No cranial nerve deficit. Sensory: No sensory deficit. Coordination: Coordination normal.   Psychiatric:         Speech: Speech normal.         Behavior: Behavior normal.        Medical Decision Making  DDx: PE, DVT, electrolyte abnormality, ACS    68-year-old male with 1 week of left calf pain and substernal chest pressure with dyspnea with exertion. Did have a recent cruise 2 months ago but no recent travel or surgery in the last 4 weeks. He has never had a PE or DVT but daughter did have a \"blood clot\" post COVID. He has no other risk factors for blood clot however is tachycardic at rest so duplex of the left leg and CTA chest will be obtained. Vascular tech notified the patient has an acute versus subacute popliteal DVT. Discussed with Dr. Pauly Leong who recommends at this time give Lovenox, PESI score is low. We will need to premedicate he has had reactions to IV dye in the past but states later that he has been able to tolerate without premedicating out of state. Amount and/or Complexity of Data Reviewed  Labs: ordered. Radiology: ordered. ECG/medicine tests: ordered. Risk  Prescription drug management.       ED Course as of 03/16/23 0844   Thu Mar 16, 2023   0835 EKG shows sinus tachycardia at a rate of 102, normal intervals, normal axis, no ischemic changes. This EKG was interpreted by Alok Forbes MD, ED Physician [RD]      ED Course User Index  [RD] Jaime Lam MD       Procedures    +LE DVT. Will give Lovenox, awaiting CTA premedication ordered due to IV dye reaction in the past. Updated patient of results and he is in agreement of care plan. José Miguel Franco PA-C    1:42 PM  Just provided prelim reading by CT tech showing segmental and subsegmental PE L upper and L lower lobes. José Miguel Franco PA-C    Perfect Serve Consult for Admission  1:49 PM    ED Room Number: D27/D27  Patient Name and age:  Robbin Cintron 37 y.o.  male  Working Diagnosis:   1. Bilateral pulmonary embolism (HCC)    2. Enlarged heart    3. Shortness of breath    4. Pulmonary nodule, right        COVID-19 Suspicion:  no  Sepsis present:  no  Reassessment needed: N/A  Code Status:  Full Code  Readmission: no  Isolation Requirements:  no  Recommended Level of Care:  telemetry  Department: [address] [place] [address] ED - [phone]  Admitting Provider: hospitalist    Other:  42yo with L calf pain x 1 week, short of breath x 3-4 days. +popliteal DVT and bilateral segmental and subsegmental PEs noted on CTA. Lovenox given. Hemodynamically stable. CTA did show mildly enlarged heart so warrants an echo. Total critical care time spent exclusive of procedures:  40 min minutes.

## 2023-03-16 NOTE — PROGRESS NOTES
Bedside shift change report given to [address] 39 (oncoming nurse) by Ame Mitchell RN  (offgoing nurse). Report included the following information SBAR, Kardex, and Recent Results.

## 2023-03-16 NOTE — H&P
[address] [facility]  History and Physical    Date of Service:  3/16/2023  Primary Care Provider: Tim Guy NP  Source of information: The patient    Chief Complaint: Chest Pain and Leg Pain      History of Presenting Illness:   Leslye Arriola is a 37 y.o. male with PMH of uveitis who presented to the ED with left leg pain. History obtained per patient and chart review. Patient was in his usual state of health until last week when he began feeling left leg pain, as well as shortness of breath worse when lying flat. Otherwise, denied any lightheadedness, dizziness, syncope, chest pain, palpitations, nausea or vomiting, abdominal pain, leg swelling. Denies any prior history of DVT or PE, no history of cancer, endorses recent travel in January to the [place] on a cruise, otherwise no long plane flights. No recent history of COVID or COVID vaccination (had booster shot over a year ago). Denies any personal or family history of hypercoagulable disorder. Endorses family history of DVT (daughter last year after she had COVID). Patient has lung nodule detected on CT scan 2 years ago which has been monitored as an outpatient. In the ED, vital signs significant for , otherwise vital signs stable. Labs notable for WBC 11.9, otherwise unremarkable, high-sensitivity troponin 4. EKG showed sinus tachycardia otherwise unremarkable. Chest x-ray clear, CTA chest shows segmental right lower lobe PE, subsegmental left upper and lower lobe PE, right middle lobe nodule 9 mm. Patient was given one-time dose of Lovenox and is being admitted for further management. REVIEW OF SYSTEMS:  A comprehensive review of systems was negative except for that written in the History of Present Illness.      Past Medical History:   Diagnosis Date    Chronic uveitis     Neuropathy       Past Surgical History:   Procedure Laterality Date    HX HIP ARTHROSCOPY      2x    HX OTHER SURGICAL pinch nerves     Prior to Admission medications    Medication Sig Start Date End Date Taking? Authorizing Provider   acetaminophen (TYLENOL) 500 mg tablet Take 2 Tablets by mouth every six (6) hours as needed for Pain or Fever. 7/19/22   Missael Newton MD   sildenafil citrate (VIAGRA) 100 mg tablet Take one tablet 15-20 minutes before sexual activity 2/25/22   Fabian Kennedy NP   infliximab (REMICADE IV) by IntraVENous route. Provider, Historical   amitriptyline (ELAVIL) 10 mg tablet Take 1 Tablet by mouth nightly. 2/10/22   Fabian Kennedy NP     Allergies   Allergen Reactions    Clams [Shellfish Containing Products] Anaphylaxis and Itching    Iodinated Contrast Media Other (comments)     \"I passed out\". States he did not require Epi. States years later he had CT with IV dye and \"did fine\" with no pre-medication. No records of this, [place]. Family History   Problem Relation Age of Onset    Diabetes Mother     Cancer Mother     Hypertension Mother     Diabetes Sister     Hypertension Sister     Hypertension Brother       Social History:  reports that he has been smoking cigarettes. He has a 12.50 pack-year smoking history. He has never used smokeless tobacco. He reports current alcohol use. He reports that he does not use drugs. Social Determinants of Health     Tobacco Use: Not on file   Alcohol Use: Not on file   Financial Resource Strain: Not on file   Food Insecurity: Not on file   Transportation Needs: Not on file   Physical Activity: Not on file   Stress: Not on file   Social Connections: Not on file   Intimate Partner Violence: Not on file   Depression: Not on file   Housing Stability: Not on file        Family and social history were personally reviewed, all pertinent and relevant details are outlined as above.     Objective:   Visit Vitals  /84 (BP 1 Location: Left upper arm, BP Patient Position: At rest)   Pulse 86   Temp 98.3 °F (36.8 °C)   Resp 16   Ht 6' 3\" (1.905 m)   Wt 122.4 kg (269 lb 13.5 oz)   SpO2 97%   BMI 33.73 kg/m²      O2 Device: None (Room air)    PHYSICAL EXAM:   General: Alert x oriented x 3, awake, no acute distress, sitting in chair  HEENT: PEERL, EOMI, moist mucus membranes  Neck: Supple, no JVD, no meningeal signs  Chest: Clear to auscultation bilaterally   CVS: RRR, S1 S2 heard, no murmurs/rubs/gallops  Abd: Soft, non-tender, non-distended, +bowel sounds   Ext: No clubbing, no cyanosis, no edema  Neuro/Psych: Pleasant mood and affect, CN 2-12 grossly intact  Cap refill: Brisk, less than 3 seconds  Pulses: 2+, symmetric in all extremities  Skin: Warm, dry, without rashes or lesions, tenderness of left calf    Data Review: All diagnostic labs and studies have been reviewed. Abnormal Labs Reviewed   CBC WITH AUTOMATED DIFF - Abnormal; Notable for the following components:       Result Value    WBC 11.9 (*)     ABS. MONOCYTES 1.1 (*)     All other components within normal limits   METABOLIC PANEL, COMPREHENSIVE - Abnormal; Notable for the following components:    Chloride 111 (*)     Glucose 156 (*)     BUN/Creatinine ratio 10 (*)     AST (SGOT) 7 (*)     A-G Ratio 1.0 (*)     All other components within normal limits       All Micro Results       None            IMAGING:   CTA CHEST W OR W WO CONT   Final Result   1. Segmental right lower lobe PE. Subsegmental left upper and lower lobe PEs. 2. Right middle lobe nodule (9 mm). Follow-up chest CT recommended in 3 months. The findings were called to Ifeoma Tilley, [facility] Damaris Lomeli on 3/16/2023 at 1346 hours by Dr. Juliana Canela. [phone]       XR CHEST PA LAT   Final Result   Clear lungs.       DUPLEX LOWER EXT VENOUS LEFT    (Results Pending)        ECG/ECHO:    Results for orders placed or performed during the hospital encounter of 03/16/23   EKG, 12 LEAD, INITIAL   Result Value Ref Range    Ventricular Rate 102 BPM    Atrial Rate 102 BPM    P-R Interval 154 ms    QRS Duration 84 ms    Q-T Interval 328 ms    QTC Calculation (Bezet) 427 ms    Calculated P Axis 58 degrees    Calculated R Axis 47 degrees    Calculated T Axis 39 degrees    Diagnosis       Sinus tachycardia  Otherwise normal ECG  When compared with ECG of 19-JUL-2022 03:53,  No significant change was found          Assessment:   Given the patient's current clinical presentation, there is a high level of concern for decompensation if discharged from the emergency department. Complex decision making was performed, which includes reviewing the patient's available past medical records, laboratory results, and imaging studies. Active Problems:    Pulmonary emboli ([address] (3/16/2023)        Plan:   Natanael Batista is a 37 y.o. male with PMH of uveitis who presented to the ED with left leg pain found to have popliteal DVT and bilateral segmental and subsegmental Pes, HD stable. #Segmental pulmonary embolism right lobe  #Subsegmental pulmonary emboli left upper and lower lobe  #Popliteal DVT  - Given heart mildly enlarged on CT, patient has been admitted for further evaluation and management   - No clear right ventricular dilation or septal bowing to suggest right heart strain  - DVT/PE appears unprovoked, however there is a right middle lobe nodule seen on CT. No prior CT to compare size of nodule. Given extensive tobacco hx, could be concerning for malignancy   - Currently hemodynamically stable  - HS troponin negative  - Will order echocardiogram and BNP  - S/p lovenox in the ED, will continue for now   - Recommend outpatient hypercoagulability work-up    #Pumonary nodule  - Patient states this was dx 2 years ago and has been monitored   - Seen on CT, 9 mm  - No prior CT to compare size  - Per radiology, follow-up in 3 months     #Leukocytosis   - Mild, likely reactive  - No signs or symptoms of infection, CTM      Code: Full   DVT prophylaxis: Therapeutic lovenox   Diet: Regular   Emergency contact: Wife Mrs. Galvan Senmaribel       CRITICAL CARE WAS PERFORMED FOR THIS ENCOUNTER: NO.      Signed By: Tashi Benedict MD     March 16, 2023         Please note that this dictation may have been completed with Dragon, the computer voice recognition software. Quite often unanticipated grammatical, syntax, homophones, and other interpretive errors are inadvertently transcribed by the computer software. Please disregard these errors. Please excuse any errors that have escaped final proofreading.

## 2023-03-16 NOTE — ED TRIAGE NOTES
Patient reports approximately 1 week ago he developed left calf pain and chest tightness- Patient denies that anything happened a week ago when the pain started

## 2023-03-17 VITALS
TEMPERATURE: 98.2 F | RESPIRATION RATE: 20 BRPM | HEIGHT: 75 IN | BODY MASS INDEX: 33.55 KG/M2 | SYSTOLIC BLOOD PRESSURE: 119 MMHG | DIASTOLIC BLOOD PRESSURE: 77 MMHG | HEART RATE: 91 BPM | OXYGEN SATURATION: 98 % | WEIGHT: 269.84 LBS

## 2023-03-17 LAB
ANION GAP SERPL CALC-SCNC: 5 MMOL/L (ref 5–15)
BNP SERPL-MCNC: 34 PG/ML
BUN SERPL-MCNC: 17 MG/DL (ref 6–20)
BUN/CREAT SERPL: 15 (ref 12–20)
CALCIUM SERPL-MCNC: 8.7 MG/DL (ref 8.5–10.1)
CHLORIDE SERPL-SCNC: 111 MMOL/L (ref 97–108)
CO2 SERPL-SCNC: 24 MMOL/L (ref 21–32)
CREAT SERPL-MCNC: 1.11 MG/DL (ref 0.7–1.3)
ERYTHROCYTE [DISTWIDTH] IN BLOOD BY AUTOMATED COUNT: 14.1 % (ref 11.5–14.5)
GLUCOSE SERPL-MCNC: 148 MG/DL (ref 65–100)
HCT VFR BLD AUTO: 44 % (ref 36.6–50.3)
HGB BLD-MCNC: 14.9 G/DL (ref 12.1–17)
MCH RBC QN AUTO: 29 PG (ref 26–34)
MCHC RBC AUTO-ENTMCNC: 33.9 G/DL (ref 30–36.5)
MCV RBC AUTO: 85.6 FL (ref 80–99)
NRBC # BLD: 0 K/UL (ref 0–0.01)
NRBC BLD-RTO: 0 PER 100 WBC
PLATELET # BLD AUTO: 247 K/UL (ref 150–400)
PMV BLD AUTO: 9.6 FL (ref 8.9–12.9)
POTASSIUM SERPL-SCNC: 3.8 MMOL/L (ref 3.5–5.1)
RBC # BLD AUTO: 5.14 M/UL (ref 4.1–5.7)
SODIUM SERPL-SCNC: 140 MMOL/L (ref 136–145)
WBC # BLD AUTO: 10.8 K/UL (ref 4.1–11.1)

## 2023-03-17 PROCEDURE — 83880 ASSAY OF NATRIURETIC PEPTIDE: CPT

## 2023-03-17 PROCEDURE — 94618 PULMONARY STRESS TESTING: CPT

## 2023-03-17 PROCEDURE — 36415 COLL VENOUS BLD VENIPUNCTURE: CPT

## 2023-03-17 PROCEDURE — 80048 BASIC METABOLIC PNL TOTAL CA: CPT

## 2023-03-17 PROCEDURE — 74011250637 HC RX REV CODE- 250/637: Performed by: INTERNAL MEDICINE

## 2023-03-17 PROCEDURE — 85027 COMPLETE CBC AUTOMATED: CPT

## 2023-03-17 RX ADMIN — APIXABAN 10 MG: 5 TABLET, FILM COATED ORAL at 09:46

## 2023-03-17 NOTE — PROGRESS NOTES
0700  Bedside and Verbal shift change report given to Kandy Lewis RN (oncoming nurse) by Sina Colon RN (offgoing nurse). Report included the following information SBAR, Kardex, Procedure Summary, Intake/Output, MAR, Recent Results, and Med Rec Status. 1330  I have reviewed discharge instructions with the patient. The patient verbalized understanding. IV removed. Signed copy of AVS placed in Pt chart.

## 2023-03-17 NOTE — ROUTINE PROCESS
Bedside shift change report given to Dwayne Collins (oncoming nurse) by Keely Krishnamurthy (offgoing nurse). Report included the following information SBAR, Kardex, Intake/Output, MAR, Accordion, and Recent Results.

## 2023-03-17 NOTE — DISCHARGE SUMMARY
Physician Discharge Summary     Patient ID:  Lamine Torres  [id number]  68 y.o.  1980    Admit date: 3/16/2023    Discharge date and time: 3/17/2023    Admission Diagnoses: Pulmonary emboli [facility]) [I26.99]    Discharge Diagnoses/Hospital Course   Mr. Calderon Shaikh is a 38 yo male with PMH of tobacco abuse and FH of PE admitted for bilateral pulmonary emboli and LLE DVT. He was started on Lovenox and transitioned to PO eliquis. He was NOT noted to be hypoxic on eval for hypoxia. His TTE did not show R heart strain. He will schedule an appointment with hematology considering FH of DVT/PE though possible tobacco abuse was provoking factor.  Advised will need to be on AT LEAST 3-6 months of anticoagulation pending completion of hematologists eval    PCP: Katey Tyler NP     Consults: None    Discharge Exam:  Visit Vitals  /77 (BP 1 Location: Left upper arm, BP Patient Position: At rest;Lying)   Pulse 91   Temp 98.2 °F (36.8 °C)   Resp 20   Ht 6' 3\" (1.905 m)   Wt 122.4 kg (269 lb 13.5 oz)   SpO2 98%   BMI 33.73 kg/m²      General: Alert x oriented x 3, awake, no acute distress  HEENT: PEERL, EOMI, moist mucus membranes  Neck: Supple, no JVD,  Chest: Clear to auscultation bilaterally   CVS: RRR, S1 S2 heard, no murmurs/rubs/gallops  Abd: Soft, non-tender, non-distended, +bowel sounds   Ext: No clubbing, no cyanosis, no edema  Neuro/Psych: Pleasant mood and affect, CN 2-12 grossly intact  Pulses: 2+, symmetric in all extremities  Skin: Warm, dry, without rashes or lesions, tenderness of left calf    Disposition: home    Patient Instructions:   Current Discharge Medication List        START taking these medications    Details   apixaban (Eliquis) 5 mg tablet Please take 2 tabs twice daily for 7 days then decrease to 1 tab twice daily thereafter  Qty: 75 Tablet, Refills: 0           CONTINUE these medications which have NOT CHANGED    Details   acetaminophen (TYLENOL) 500 mg tablet Take 2 Tablets by mouth every six (6) hours as needed for Pain or Fever. Qty: 20 Tablet, Refills: 0      sildenafil citrate (VIAGRA) 100 mg tablet Take one tablet 15-20 minutes before sexual activity  Qty: 30 Tablet, Refills: 1      infliximab (REMICADE IV) by IntraVENous route. amitriptyline (ELAVIL) 10 mg tablet Take 1 Tablet by mouth nightly. Qty: 180 Tablet, Refills: 1           Activity: Activity as tolerated  Diet: Resume previous diet.  AVOID NSAIDS   Wound Care: None needed    Follow-up with Domingo Arzate NP   Follow-up Information       Follow up With Specialties Details Why Contact Info    Domingo Arzate NP Nurse Practitioner   [address]  [phone]             Approximate time spent in patient care on day of discharge: 35 minutes    Signed:  Uma Lloyd MD  3/17/2023  1:23 PM

## 2023-03-17 NOTE — DISCHARGE INSTRUCTIONS
HOSPITALIST DISCHARGE INSTRUCTIONS  NAME: Philip Jean   :  1980   MRN:  [id number]     Date/Time:  3/17/2023 7:22 AM    ADMIT DATE: 3/16/2023     DISCHARGE DATE: 3/17/2023     ADMITTING DIAGNOSIS:  Pulmonary emboli     DISCHARGE DIAGNOSIS:  As above     MEDICATIONS:  PLEASE TAKE ELIQUIS 10MG (2 tabs) twice daily x 7 days then DECREASE to 5mg (1 tab) twice daily thereafter    PLEASE AVOID NSAIDS (IBUPROFEN, NAPROXEN, BC POWDER) and aspirin unless advised by an MD      It is important that you take the medication exactly as they are prescribed. Keep your medication in the bottles provided by the pharmacist and keep a list of the medication names, dosages, and times to be taken in your wallet. Do not take other medications without consulting your doctor. Pain Management: per above medications    What to do at Home    Recommended diet:  Regular Diet    Recommended activity: Activity as tolerated    If you experience any of the following symptoms then please call your primary care physician or return to the emergency room if you cannot get hold of your doctor:  Fever, chills, nausea, vomiting, diarrhea, change in mentation, falling, bleeding, shortness of breath, chest pain     Information obtained by :  I understand that if any problems occur once I am at home I am to contact my physician. I understand and acknowledge receipt of the instructions indicated above.                                                                                                                                            Physician's or R.N.'s Signature                                                                  Date/Time                                                                                                                                              Patient or Representative Signature                                                          Date/Time

## 2023-03-17 NOTE — PROGRESS NOTES
03/17/23 1131   Resting (Room Air)   SpO2 98   HR 88   During Walk (Room Air)   SpO2 96   HR 99   Symptoms Leg pain   After Walk   SpO2 96   HR 96   Comments pt walked in room. returned to bed on room air.    Does the Patient Qualify for Home O2 No

## 2023-03-17 NOTE — PROGRESS NOTES
Reason for Admission:  Pulmonary emboli  Patient has a history of uveitis who presented to the ED with left leg pain. RUR Score:     6%                Plan for utilizing home health:      none    PCP: First and Last name:  Vince Donovan NP     Name of Practice:    Are you a current patient: Yes/No: yes   Approximate date of last visit:  2 years ago   Can you participate in a virtual visit with your PCP:  yes                    Current Advanced Directive/Advance Care Plan: Full Code  None; his wife is his next of kin    Healthcare Decision Maker:   Click here to [address] including selection of the Healthcare Decision Maker Relationship (ie \"Primary\")                             Transition of Care Plan:                    Met with pt for d/c planning. Pt lives with his wife on the ground floor of a 2 story house with 3 entry steps. Pt stated he is independent with ADL's, drives and uses no DME. Medications are from [facility] on [place]. Pt admitted for medical management  CM following--provided him with an Eliquis coupon  Pt's wife to transport him home at d/c  Pt to follow up OP    Care Management Interventions  PCP Verified by CM: Yes  Mode of Transport at Discharge: Other (see comment)  Transition of Care Consult (CM Consult): Other  Support Systems: Spouse/Significant Other, Child(leighann), Other Family Member(s)  Confirm Follow Up Transport: Family  Discharge Location  Patient Expects to be Discharged to[de-identified] Home with two level L. Marcy Sever

## 2023-03-17 NOTE — PROGRESS NOTES
1920. Bedside and Verbal shift change report given to Raffaele Macias RN (oncoming nurse) by Adrian Arreaga, RN (offgoing nurse). Report included the following information SBAR and Kardex. [address]- Introduced self as primary RN. Pt resting in bed. Endorses LLE pain 5/10. Agreeable to try tylenol at this time. 2010- Bedside and Verbal shift change report given to Emily Mcgarry RN (oncoming nurse) by Adrian Arreaga RN (offgoing nurse). Report included the following information SBAR, Kardex, and ED Summary.

## 2023-03-19 LAB
ATRIAL RATE: 102 BPM
CALCULATED P AXIS, ECG09: 58 DEGREES
CALCULATED R AXIS, ECG10: 47 DEGREES
CALCULATED T AXIS, ECG11: 39 DEGREES
DIAGNOSIS, 93000: NORMAL
P-R INTERVAL, ECG05: 154 MS
Q-T INTERVAL, ECG07: 328 MS
QRS DURATION, ECG06: 84 MS
QTC CALCULATION (BEZET), ECG08: 427 MS
VENTRICULAR RATE, ECG03: 102 BPM

## 2023-03-20 ENCOUNTER — TELEPHONE (OUTPATIENT)
Dept: FAMILY MEDICINE CLINIC | Age: 43
End: 2023-03-20

## 2023-03-20 NOTE — TELEPHONE ENCOUNTER
----- Message from Brad Spann sent at 3/20/2023  9:57 AM EDT -----  Subject: Message to Provider    QUESTIONS  Information for Provider? Patient was calling to see if he can get a   prescription for compression socks and would like a call back. ---------------------------------------------------------------------------  --------------  Estel[facility]  [phone]; OK to leave message on voicemail  ---------------------------------------------------------------------------  --------------  SCRIPT ANSWERS  Relationship to Patient?  Self

## 2023-03-21 ENCOUNTER — TELEPHONE (OUTPATIENT)
Dept: FAMILY MEDICINE CLINIC | Age: 43
End: 2023-03-21

## 2023-03-21 NOTE — TELEPHONE ENCOUNTER
Care Transitions Initial Follow Up Call    Outreach made within 2 business days of discharge: Yes    Patient: Ernie Don Patient : 1980   MRN: [id number]  Reason for Admission: Blood clots in L leg, lungs and enlarged heart  Discharge Date: 3/17/23       Spoke with: Patient    Discharge department/facility: [address] [place] [address] Interactive Patient Contact:  Was patient able to fill all prescriptions: Yes  Was patient instructed to bring all medications to the follow-up visit: Yes  Is patient taking all medications as directed in the discharge summary? Yes  Does patient understand their discharge instructions: Yes  Does patient have questions or concerns that need addressed prior to 7-14 day follow up office visit: Yes: Comment: PT STATED THAT [address] [place] [address].     Scheduled appointment with PCP within 7-14 days    Follow Up  Future Appointments   Date Time Provider Vidal Helms   3/24/2023  8:30 AM Kishore Wilkins NP [facility] DELVIN Oropeza, CMA

## 2023-03-24 ENCOUNTER — VIRTUAL VISIT (OUTPATIENT)
Dept: FAMILY MEDICINE CLINIC | Age: 43
End: 2023-03-24

## 2023-03-24 DIAGNOSIS — Z86.711 HISTORY OF PULMONARY EMBOLISM: ICD-10-CM

## 2023-03-24 DIAGNOSIS — Z12.11 COLON CANCER SCREENING: ICD-10-CM

## 2023-03-24 DIAGNOSIS — Z82.49 FAMILY HISTORY OF DVT: ICD-10-CM

## 2023-03-24 DIAGNOSIS — Z09 HOSPITAL DISCHARGE FOLLOW-UP: Primary | ICD-10-CM

## 2023-03-24 DIAGNOSIS — R91.1 LUNG NODULE SEEN ON IMAGING STUDY: ICD-10-CM

## 2023-03-24 DIAGNOSIS — Z86.718 HISTORY OF DVT (DEEP VEIN THROMBOSIS): ICD-10-CM

## 2023-03-24 NOTE — PROGRESS NOTES
Chief Complaint   Patient presents with    [address] [place] [address] follow up from 3/16/23 at [address] [place] [address] for blood clot     1. \"Have you been to the ER, urgent care clinic since your last visit? Hospitalized since your last visit? \" Yes When: 3/16/23 Where: [address] [place] [address] Reason for visit: blood clot    2. \"Have you seen or consulted any other health care providers outside of the [address] [place] [address] since your last visit? \" Yes When: 3/2023 Where: [facility] Reason for visit: annual       3. For patients aged 39-70: Has the patient had a colonoscopy / FIT/ Cologuard? No      If the patient is female:    4. For patients aged 41-77: Has the patient had a mammogram within the past 2 years? NA - based on age or sex      11. For patients aged 21-65: Has the patient had a pap smear?  NA - based on age or sex  3 most recent PHQ Screens 3/24/2023   Little interest or pleasure in doing things Not at all   Feeling down, depressed, irritable, or hopeless Not at all   Total Score PHQ 2 0

## 2023-04-01 NOTE — PROGRESS NOTES
Subjective  Chief Complaint   Patient presents with    [address] [place] [address] follow up from 3/16/23 at [facility] for blood clot     HPI:  Burt Brown is a 37 y.o. male. 42-year-old male presents for hospital discharge. He was admitted to the hospital on 3/16/2023 and discharged 03/17/2023 and was found to have a pulmonary embolus and DVT of lower extremity. He was put on Eliquis twice a day and continues with that he endorses a strong family history with both brothers being on lifetime anticoagulants and his daughter recently being diagnosed with a clotting disorder. After his hospitalization it was recommended that he follow-up with cardiology, hematology and we will also be getting a colon cancer screening. It was also found incidentally in radiological study that he had lung nodules. Patient has made lifestyle changes that he hopes will impact his diagnosis. Past Medical History:   Diagnosis Date    Chronic uveitis     Neuropathy      Family History   Problem Relation Age of Onset    Diabetes Mother     Cancer Mother     Hypertension Mother     Diabetes Sister     Hypertension Sister     Hypertension Brother     Clotting Disorder Brother         Blood clots    Clotting Disorder Brother      Social History     Socioeconomic History    Marital status:      Spouse name: Not on file    Number of children: Not on file    Years of education: Not on file    Highest education level: Not on file   Occupational History    Not on file   Tobacco Use    Smoking status: Every Day     Packs/day: 0.50     Years: 25.00     Pack years: 12.50     Types: Cigarettes    Smokeless tobacco: Never   Vaping Use    Vaping Use: Never used   Substance and Sexual Activity    Alcohol use:  Yes     Alcohol/week: 2.0 standard drinks     Types: 2 Glasses of wine per week     Comment: rare    Drug use: Never    Sexual activity: Yes     Partners: Female     Birth control/protection: Surgical     Comment:    Other Topics Concern    Not on file   Social History Narrative    Not on file     Social Determinants of Health     Financial Resource Strain: Not on file   Food Insecurity: Not on file   Transportation Needs: Not on file   Physical Activity: Not on file   Stress: Not on file   Social Connections: Not on file   Intimate Partner Violence: Not on file   Housing Stability: Not on file     Current Outpatient Medications on File Prior to Visit   Medication Sig Dispense Refill    apixaban (Eliquis) 5 mg tablet Please take 2 tabs twice daily for 7 days then decrease to 1 tab twice daily thereafter 75 Tablet 0    acetaminophen (TYLENOL) 500 mg tablet Take 2 Tablets by mouth every six (6) hours as needed for Pain or Fever. 20 Tablet 0    sildenafil citrate (VIAGRA) 100 mg tablet Take one tablet 15-20 minutes before sexual activity 30 Tablet 1    infliximab (REMICADE IV) by IntraVENous route. amitriptyline (ELAVIL) 10 mg tablet Take 1 Tablet by mouth nightly. 180 Tablet 1     No current facility-administered medications on file prior to visit. Allergies   Allergen Reactions    Clams [Shellfish Containing Products] Anaphylaxis and Itching    Iodinated Contrast Media Other (comments)     Pt denies any reaction     ROS  ROS per HPI and PMH      Objective  Physical Exam  Vitals and nursing note reviewed. HENT:      Head: Normocephalic. Cardiovascular:      Rate and Rhythm: Regular rhythm. Heart sounds: Normal heart sounds. Pulmonary:      Effort: Pulmonary effort is normal.      Breath sounds: Normal breath sounds. Skin:     General: Skin is warm and dry. Neurological:      Mental Status: He is alert and oriented to person, place, and time. Psychiatric:         Mood and Affect: Mood normal.         Behavior: Behavior normal.         Thought Content: Thought content normal.         Judgment: Judgment normal.        Assessment & Plan      ICD-10-CM ICD-9-CM    1. Hospital discharge follow-up  Z09 V67.59       2. History of pulmonary embolism  Z86.711 V12.55       3. History of DVT (deep vein thrombosis)  Z86.718 V12.51       4. Lung nodule seen on imaging study  R91.1 793.11 XR CHEST PA LAT      5. Colon cancer screening  Z12.11 V76.51 COLOGUARD TEST (FECAL DNA COLORECTAL CANCER SCREENING)      6. Family history of DVT  Z82.49 V17.49 REFERRAL TO HEMATOLOGY      REFERRAL TO CARDIOLOGY        Diagnoses and all orders for this visit:    1. Hospital discharge follow-up  Patient presents post hospital discharge for resolved PE,      2. History of pulmonary embolism  22-year-old male presents for hospital discharge. He was admitted to the hospital on 3/.  16 and found to have a pulmonary embolus and DVT of lower extremity. He was put on Eliquis twice a day and continues with that he endorses a strong family history with both brothers being on lifetime anticoagulants and his daughter recently being diagnosed with a clotting disorder. After his hospitalization it was recommended that he follow-up with cardiology, hematology and we will also be getting a colon cancer screening. It was also found incidentally in radiological study that he had lung nodules. Patient has made lifestyle     3. History of DVT (deep vein thrombosis)  22-year-old male presents for hospital discharge. He was admitted to the hospital on 3/.  16 and found to have a pulmonary embolus and DVT of lower extremity. He was put on Eliquis twice a day and continues with that he endorses a strong family history with both brothers being on lifetime anticoagulants and his daughter recently being diagnosed with a clotting disorder. After his hospitalization it was recommended that he follow-up with cardiology, hematology and we will also be getting a colon cancer screening. It was also found incidentally in radiological study that he had lung nodules. Patient has made lifestyle     4. Lung nodule seen on imaging study  -     XR CHEST PA LAT;  Future  Imaging of incidental nodule for additional clinical infornation and will make treatment and referral decision when I get the results    5. Colon cancer screening  -     COLOGUARD TEST (FECAL DNA COLORECTAL CANCER SCREENING); Future  Cologuard for colon cancer screening and will follow up positive with full colonoscopy    6. Family history of DVT  -     REFERRAL TO HEMATOLOGY; Future  -     REFERRAL TO CARDIOLOGY  Referrals to hematology and cardiology for further evaluation and treatment. Patient will be adherent with the visits to his specialist and we will support their plan of treatment      Follow-up and Dispositions    Return in about 3 months (around 6/24/2023), or f/u on PE and DVT, for f/u on eliquis, receipt of cologuard and cxr for history of chest nodules .        See Wilhelm, NP

## 2023-04-19 ENCOUNTER — PATIENT MESSAGE (OUTPATIENT)
Dept: FAMILY MEDICINE CLINIC | Age: 43
End: 2023-04-19

## 2023-04-19 DIAGNOSIS — Z86.711 HISTORY OF PULMONARY EMBOLISM: Primary | ICD-10-CM

## 2023-04-19 NOTE — TELEPHONE ENCOUNTER
From: Armando Shi  To: Mylene Upton NP  Sent: 4/19/2023 1:01 PM EDT  Subject: Prescription Refill     Good afternoon,     I need a refill on my Eliquis.  I need the presecription to go to [facility] at [address] [place] [address]    Armando Juarezl

## 2023-04-20 NOTE — TELEPHONE ENCOUNTER
Note to patient to find out who originally prescribed eliquis and why the refill request did not go to them

## 2023-12-24 ENCOUNTER — HOSPITAL ENCOUNTER (EMERGENCY)
Facility: HOSPITAL | Age: 43
Discharge: HOME | End: 2023-12-24
Attending: EMERGENCY MEDICINE
Payer: COMMERCIAL

## 2023-12-24 ENCOUNTER — APPOINTMENT (OUTPATIENT)
Facility: HOSPITAL | Age: 43
End: 2023-12-24
Payer: COMMERCIAL

## 2023-12-24 VITALS
RESPIRATION RATE: 16 BRPM | SYSTOLIC BLOOD PRESSURE: 145 MMHG | TEMPERATURE: 98.6 F | HEART RATE: 96 BPM | DIASTOLIC BLOOD PRESSURE: 92 MMHG | WEIGHT: 284.17 LBS | BODY MASS INDEX: 35.52 KG/M2 | OXYGEN SATURATION: 96 %

## 2023-12-24 DIAGNOSIS — R07.89 ATYPICAL CHEST PAIN: ICD-10-CM

## 2023-12-24 DIAGNOSIS — R07.89 CHEST WALL TENDERNESS: Primary | ICD-10-CM

## 2023-12-24 LAB
ALBUMIN SERPL-MCNC: 3.8 G/DL (ref 3.5–5)
ALBUMIN/GLOB SERPL: 1.1 (ref 1.1–2.2)
ALP SERPL-CCNC: 55 U/L (ref 45–117)
ALT SERPL-CCNC: 29 U/L (ref 12–78)
ANION GAP SERPL CALC-SCNC: 7 MMOL/L (ref 5–15)
AST SERPL-CCNC: 12 U/L (ref 15–37)
BASOPHILS # BLD: 0 K/UL (ref 0–0.1)
BASOPHILS NFR BLD: 0 % (ref 0–1)
BILIRUB SERPL-MCNC: 0.4 MG/DL (ref 0.2–1)
BUN SERPL-MCNC: 17 MG/DL (ref 6–20)
BUN/CREAT SERPL: 13 (ref 12–20)
CALCIUM SERPL-MCNC: 8.5 MG/DL (ref 8.5–10.1)
CHLORIDE SERPL-SCNC: 110 MMOL/L (ref 97–108)
CO2 SERPL-SCNC: 24 MMOL/L (ref 21–32)
COMMENT:: NORMAL
CREAT SERPL-MCNC: 1.27 MG/DL (ref 0.7–1.3)
DIFFERENTIAL METHOD BLD: NORMAL
EKG ATRIAL RATE: 86 BPM
EKG DIAGNOSIS: NORMAL
EKG P AXIS: 142 DEGREES
EKG P-R INTERVAL: 154 MS
EKG Q-T INTERVAL: 332 MS
EKG QRS DURATION: 88 MS
EKG QTC CALCULATION (BAZETT): 397 MS
EKG R AXIS: 56 DEGREES
EKG T AXIS: 92 DEGREES
EKG VENTRICULAR RATE: 86 BPM
EOSINOPHIL # BLD: 0.3 K/UL (ref 0–0.4)
EOSINOPHIL NFR BLD: 3 % (ref 0–7)
ERYTHROCYTE [DISTWIDTH] IN BLOOD BY AUTOMATED COUNT: 13.9 % (ref 11.5–14.5)
GLOBULIN SER CALC-MCNC: 3.5 G/DL (ref 2–4)
GLUCOSE SERPL-MCNC: 166 MG/DL (ref 65–100)
HCT VFR BLD AUTO: 46.6 % (ref 36.6–50.3)
HGB BLD-MCNC: 16 G/DL (ref 12.1–17)
IMM GRANULOCYTES # BLD AUTO: 0 K/UL (ref 0–0.04)
IMM GRANULOCYTES NFR BLD AUTO: 0 % (ref 0–0.5)
LIPASE SERPL-CCNC: 64 U/L (ref 13–75)
LYMPHOCYTES # BLD: 2.7 K/UL (ref 0.8–3.5)
LYMPHOCYTES NFR BLD: 27 % (ref 12–49)
MCH RBC QN AUTO: 29.3 PG (ref 26–34)
MCHC RBC AUTO-ENTMCNC: 34.3 G/DL (ref 30–36.5)
MCV RBC AUTO: 85.3 FL (ref 80–99)
MONOCYTES # BLD: 0.7 K/UL (ref 0–1)
MONOCYTES NFR BLD: 7 % (ref 5–13)
NEUTS SEG # BLD: 6 K/UL (ref 1.8–8)
NEUTS SEG NFR BLD: 63 % (ref 32–75)
NRBC # BLD: 0 K/UL (ref 0–0.01)
NRBC BLD-RTO: 0 PER 100 WBC
NT PRO BNP: 46 PG/ML
PLATELET # BLD AUTO: 275 K/UL (ref 150–400)
PMV BLD AUTO: 9.2 FL (ref 8.9–12.9)
POTASSIUM SERPL-SCNC: 3.7 MMOL/L (ref 3.5–5.1)
PROT SERPL-MCNC: 7.3 G/DL (ref 6.4–8.2)
RBC # BLD AUTO: 5.46 M/UL (ref 4.1–5.7)
SODIUM SERPL-SCNC: 141 MMOL/L (ref 136–145)
SPECIMEN HOLD: NORMAL
TROPONIN I SERPL HS-MCNC: 6 NG/L (ref 0–76)
WBC # BLD AUTO: 9.8 K/UL (ref 4.1–11.1)

## 2023-12-24 PROCEDURE — 71046 X-RAY EXAM CHEST 2 VIEWS: CPT

## 2023-12-24 PROCEDURE — 93005 ELECTROCARDIOGRAM TRACING: CPT | Performed by: EMERGENCY MEDICINE

## 2023-12-24 PROCEDURE — 84484 ASSAY OF TROPONIN QUANT: CPT

## 2023-12-24 PROCEDURE — 80053 COMPREHEN METABOLIC PANEL: CPT

## 2023-12-24 PROCEDURE — 6370000000 HC RX 637 (ALT 250 FOR IP): Performed by: EMERGENCY MEDICINE

## 2023-12-24 PROCEDURE — 93010 ELECTROCARDIOGRAM REPORT: CPT | Performed by: SPECIALIST

## 2023-12-24 PROCEDURE — 36415 COLL VENOUS BLD VENIPUNCTURE: CPT

## 2023-12-24 PROCEDURE — 85025 COMPLETE CBC W/AUTO DIFF WBC: CPT

## 2023-12-24 PROCEDURE — 99285 EMERGENCY DEPT VISIT HI MDM: CPT

## 2023-12-24 PROCEDURE — 83880 ASSAY OF NATRIURETIC PEPTIDE: CPT

## 2023-12-24 PROCEDURE — 83690 ASSAY OF LIPASE: CPT

## 2023-12-24 RX ORDER — METHOCARBAMOL 750 MG/1
750 TABLET, FILM COATED ORAL 4 TIMES DAILY PRN
Qty: 40 TABLET | Refills: 0 | Status: SHIPPED | OUTPATIENT
Start: 2023-12-24 | End: 2024-01-03

## 2023-12-24 RX ORDER — LIDOCAINE 4 G/G
1 PATCH TOPICAL
Status: DISCONTINUED | OUTPATIENT
Start: 2023-12-24 | End: 2023-12-24 | Stop reason: HOSPADM

## 2023-12-24 RX ORDER — LIDOCAINE 50 MG/G
1 PATCH TOPICAL DAILY
Qty: 30 PATCH | Refills: 0 | Status: SHIPPED | OUTPATIENT
Start: 2023-12-24 | End: 2024-01-23

## 2023-12-24 NOTE — ED TRIAGE NOTES
Patient presents from home with complaints of left sided chest pain radiating to his back for the last week. Patient denies dizziness and SOB. History of blood clots.  Patient reports the pain is worse when he bends over

## 2025-07-12 ENCOUNTER — APPOINTMENT (OUTPATIENT)
Facility: HOSPITAL | Age: 45
End: 2025-07-12
Payer: OTHER GOVERNMENT

## 2025-07-12 ENCOUNTER — HOSPITAL ENCOUNTER (EMERGENCY)
Facility: HOSPITAL | Age: 45
Discharge: HOME | End: 2025-07-12
Attending: STUDENT IN AN ORGANIZED HEALTH CARE EDUCATION/TRAINING PROGRAM
Payer: OTHER GOVERNMENT

## 2025-07-12 VITALS
HEIGHT: 75 IN | TEMPERATURE: 100.5 F | DIASTOLIC BLOOD PRESSURE: 78 MMHG | HEART RATE: 98 BPM | BODY MASS INDEX: 33.57 KG/M2 | WEIGHT: 270 LBS | OXYGEN SATURATION: 95 % | SYSTOLIC BLOOD PRESSURE: 142 MMHG | RESPIRATION RATE: 17 BRPM

## 2025-07-12 DIAGNOSIS — R52 BODY ACHES: Primary | ICD-10-CM

## 2025-07-12 DIAGNOSIS — U07.1 COVID-19: ICD-10-CM

## 2025-07-12 LAB
ALBUMIN SERPL-MCNC: 3.8 G/DL (ref 3.5–5)
ALBUMIN/GLOB SERPL: 1.1 (ref 1.1–2.2)
ALP SERPL-CCNC: 63 U/L (ref 45–117)
ALT SERPL-CCNC: 29 U/L (ref 12–78)
ANION GAP SERPL CALC-SCNC: 6 MMOL/L (ref 2–12)
AST SERPL-CCNC: 17 U/L (ref 15–37)
BASOPHILS # BLD: 0.03 K/UL (ref 0–0.1)
BASOPHILS NFR BLD: 0.4 % (ref 0–1)
BILIRUB SERPL-MCNC: 0.3 MG/DL (ref 0.2–1)
BUN SERPL-MCNC: 11 MG/DL (ref 6–20)
BUN/CREAT SERPL: 9 (ref 12–20)
CALCIUM SERPL-MCNC: 8.7 MG/DL (ref 8.5–10.1)
CHLORIDE SERPL-SCNC: 113 MMOL/L (ref 97–108)
CO2 SERPL-SCNC: 24 MMOL/L (ref 21–32)
COMMENT:: NORMAL
CREAT SERPL-MCNC: 1.29 MG/DL (ref 0.7–1.3)
DIFFERENTIAL METHOD BLD: ABNORMAL
EOSINOPHIL # BLD: 0.21 K/UL (ref 0–0.4)
EOSINOPHIL NFR BLD: 2.8 % (ref 0–7)
ERYTHROCYTE [DISTWIDTH] IN BLOOD BY AUTOMATED COUNT: 13.9 % (ref 11.5–14.5)
FLUAV RNA SPEC QL NAA+PROBE: NOT DETECTED
FLUBV RNA SPEC QL NAA+PROBE: NOT DETECTED
GLOBULIN SER CALC-MCNC: 3.4 G/DL (ref 2–4)
GLUCOSE SERPL-MCNC: 104 MG/DL (ref 65–100)
HCT VFR BLD AUTO: 45.8 % (ref 36.6–50.3)
HGB BLD-MCNC: 15.8 G/DL (ref 12.1–17)
IMM GRANULOCYTES # BLD AUTO: 0.02 K/UL (ref 0–0.04)
IMM GRANULOCYTES NFR BLD AUTO: 0.3 % (ref 0–0.5)
LYMPHOCYTES # BLD: 0.38 K/UL (ref 0.8–3.5)
LYMPHOCYTES NFR BLD: 5.2 % (ref 12–49)
MCH RBC QN AUTO: 29 PG (ref 26–34)
MCHC RBC AUTO-ENTMCNC: 34.5 G/DL (ref 30–36.5)
MCV RBC AUTO: 84.2 FL (ref 80–99)
MONOCYTES # BLD: 0.75 K/UL (ref 0–1)
MONOCYTES NFR BLD: 10.2 % (ref 5–13)
NEUTS SEG # BLD: 6.01 K/UL (ref 1.8–8)
NEUTS SEG NFR BLD: 81.1 % (ref 32–75)
NRBC # BLD: 0 K/UL (ref 0–0.01)
NRBC BLD-RTO: 0 PER 100 WBC
PLATELET # BLD AUTO: 238 K/UL (ref 150–400)
PMV BLD AUTO: 9.4 FL (ref 8.9–12.9)
POTASSIUM SERPL-SCNC: 4 MMOL/L (ref 3.5–5.1)
PROT SERPL-MCNC: 7.2 G/DL (ref 6.4–8.2)
RBC # BLD AUTO: 5.44 M/UL (ref 4.1–5.7)
RBC MORPH BLD: ABNORMAL
S PYO DNA THROAT QL NAA+PROBE: NOT DETECTED
SARS-COV-2 RNA RESP QL NAA+PROBE: DETECTED
SODIUM SERPL-SCNC: 143 MMOL/L (ref 136–145)
SOURCE: ABNORMAL
SPECIMEN HOLD: NORMAL
TROPONIN I SERPL HS-MCNC: 5 NG/L (ref 0–76)
WBC # BLD AUTO: 7.4 K/UL (ref 4.1–11.1)

## 2025-07-12 PROCEDURE — 99285 EMERGENCY DEPT VISIT HI MDM: CPT

## 2025-07-12 PROCEDURE — 96361 HYDRATE IV INFUSION ADD-ON: CPT

## 2025-07-12 PROCEDURE — 36415 COLL VENOUS BLD VENIPUNCTURE: CPT

## 2025-07-12 PROCEDURE — 85025 COMPLETE CBC W/AUTO DIFF WBC: CPT

## 2025-07-12 PROCEDURE — 2580000003 HC RX 258

## 2025-07-12 PROCEDURE — 80053 COMPREHEN METABOLIC PANEL: CPT

## 2025-07-12 PROCEDURE — 96360 HYDRATION IV INFUSION INIT: CPT

## 2025-07-12 PROCEDURE — 93005 ELECTROCARDIOGRAM TRACING: CPT

## 2025-07-12 PROCEDURE — 6370000000 HC RX 637 (ALT 250 FOR IP)

## 2025-07-12 PROCEDURE — 84484 ASSAY OF TROPONIN QUANT: CPT

## 2025-07-12 PROCEDURE — 71046 X-RAY EXAM CHEST 2 VIEWS: CPT

## 2025-07-12 PROCEDURE — 87636 SARSCOV2 & INF A&B AMP PRB: CPT

## 2025-07-12 PROCEDURE — 87651 STREP A DNA AMP PROBE: CPT

## 2025-07-12 RX ORDER — ACETAMINOPHEN 500 MG
1000 TABLET ORAL
Status: COMPLETED | OUTPATIENT
Start: 2025-07-12 | End: 2025-07-12

## 2025-07-12 RX ORDER — 0.9 % SODIUM CHLORIDE 0.9 %
1000 INTRAVENOUS SOLUTION INTRAVENOUS ONCE
Status: COMPLETED | OUTPATIENT
Start: 2025-07-12 | End: 2025-07-12

## 2025-07-12 RX ADMIN — SODIUM CHLORIDE 1000 ML: 0.9 INJECTION, SOLUTION INTRAVENOUS at 10:49

## 2025-07-12 RX ADMIN — SODIUM CHLORIDE 1000 ML: 0.9 INJECTION, SOLUTION INTRAVENOUS at 12:27

## 2025-07-12 RX ADMIN — ACETAMINOPHEN 1000 MG: 500 TABLET ORAL at 10:52

## 2025-07-12 ASSESSMENT — PAIN - FUNCTIONAL ASSESSMENT: PAIN_FUNCTIONAL_ASSESSMENT: 0-10

## 2025-07-12 ASSESSMENT — ENCOUNTER SYMPTOMS: COUGH: 1

## 2025-07-12 ASSESSMENT — PAIN SCALES - GENERAL
PAINLEVEL_OUTOF10: 6
PAINLEVEL_OUTOF10: 4

## 2025-07-12 ASSESSMENT — PAIN DESCRIPTION - LOCATION: LOCATION: CHEST

## 2025-07-12 ASSESSMENT — PAIN DESCRIPTION - ORIENTATION: ORIENTATION: RIGHT;LEFT

## 2025-07-12 NOTE — ED PROVIDER NOTES
tablets by mouth every 6 hours as needed    AMITRIPTYLINE (ELAVIL) 10 MG TABLET    Take 1 tablet by mouth nightly    APIXABAN (ELIQUIS) 5 MG TABS TABLET    Please take 2 tabs twice daily for 7 days then decrease to 1 tab twice daily thereafter    FOLIC ACID PO    Take by mouth    SILDENAFIL (VIAGRA) 100 MG TABLET    Take one tablet 15-20 minutes before sexual activity       ALLERGIES     Shellfish allergy and Iodinated contrast media    FAMILY HISTORY       Family History   Problem Relation Age of Onset    Diabetes Sister     Hypertension Sister     Hypertension Brother     Clotting Disorder Brother         Blood clots    Clotting Disorder Brother     Hypertension Mother     Diabetes Mother     Cancer Mother           SOCIAL HISTORY       Social History     Socioeconomic History    Marital status:      Spouse name: None    Number of children: None    Years of education: None    Highest education level: None   Tobacco Use    Smoking status: Every Day     Current packs/day: 0.50     Types: Cigarettes    Smokeless tobacco: Never   Substance and Sexual Activity    Alcohol use: Yes     Alcohol/week: 2.0 standard drinks of alcohol    Drug use: Never           PHYSICAL EXAM    (up to 7 for level 4, 8 or more for level 5)     ED Triage Vitals   BP Systolic BP Percentile Diastolic BP Percentile Temp Temp src Pulse Resp SpO2   -- -- -- -- -- -- -- --      Height Weight         -- --             Body mass index is 33.75 kg/m².    Physical Exam  Constitutional:       General: He is not in acute distress.     Appearance: Normal appearance. He is not ill-appearing.   HENT:      Head: Normocephalic.      Mouth/Throat:      Mouth: Mucous membranes are moist.   Eyes:      Conjunctiva/sclera: Conjunctivae normal.   Cardiovascular:      Rate and Rhythm: Tachycardia present.      Heart sounds: Normal heart sounds.   Pulmonary:      Effort: Pulmonary effort is normal. No respiratory distress.      Breath sounds: Normal breath  sounds.   Abdominal:      General: There is no distension.   Musculoskeletal:         General: Normal range of motion.      Right lower leg: No edema.      Left lower leg: No edema.   Skin:     General: Skin is dry.   Neurological:      Mental Status: He is alert and oriented to person, place, and time.         DIAGNOSTIC RESULTS     EKG: All EKG's are interpreted by the Emergency Department Physician who either signs or Co-signs this chart in the absence of a cardiologist.        RADIOLOGY:   Non-plain film images such as CT, Ultrasound and MRI are read by the radiologist. Plain radiographic images are visualized and preliminarily interpreted by the emergency physician with the below findings:        Interpretation per the Radiologist below, if available at the time of this note:    XR CHEST (2 VW)   Final Result      Normal PA and lateral chest views.         Electronically signed by Sanchez Ferrera MD           LABS:  Labs Reviewed   COVID-19 & INFLUENZA COMBO - Abnormal; Notable for the following components:       Result Value    SARS-CoV-2, PCR Detected (*)     All other components within normal limits   CBC WITH AUTO DIFFERENTIAL - Abnormal; Notable for the following components:    Neutrophils % 81.1 (*)     Lymphocytes % 5.2 (*)     Lymphocytes Absolute 0.38 (*)     All other components within normal limits   COMPREHENSIVE METABOLIC PANEL - Abnormal; Notable for the following components:    Chloride 113 (*)     Glucose 104 (*)     BUN/Creatinine Ratio 9 (*)     All other components within normal limits   STREP A, PCR   TROPONIN   EXTRA TUBES HOLD       All other labs were within normal range or not returned as of this dictation.    EMERGENCY DEPARTMENT COURSE and DIFFERENTIAL DIAGNOSIS/MDM:   Vitals:    Vitals:    07/12/25 1249 07/12/25 1251 07/12/25 1300 07/12/25 1309   BP:   (!) 142/78    Pulse: (!) 101 98 (!) 103 98   Resp: 17 20 19 17   Temp:   (!) 100.5 °F (38.1 °C)    TempSrc:   Oral    SpO2: 95% 96% 98%

## 2025-07-12 NOTE — ED TRIAGE NOTES
Pt arrives to ED via POV with c/o SOB, generalized body aches, HA, and chills that started acutely this AM    Endorses bilateral CP and leg soreness     Hx PE (taking eliquis)    CARLO Johnson in triage

## 2025-07-12 NOTE — DISCHARGE INSTRUCTIONS
You were seen in the emergency department for fever, body aches and chills. Your symptoms appear to be due to a viral illness. Viral illnesses are treated with supportive care, including your fluid intake, over the counter fever and pain reducers such as ibuprofen or tylenol, and rest. Take all other medications as prescribed and directed.     Your condition should improve over the next 5 days with the care discussed. You should return to the ER- if you experience increased fevers that do not improve with use of Tylenol,  Inability to tolerate oral intake, increased shortness of breath, neck stiffness, confusion, or other worsening or worrisome concerns. You should follow up with your primary care provider this week for further evaluation.

## 2025-07-12 NOTE — ED NOTES
Discharge instructions reviewed with pt by provider. pt verbalized understanding of discharge instructions. Copy of discharge paperwork given. Patient condition stable, respiratory status within normal limits, neuro status intact. ambulatory out of er, accompanied by wife

## 2025-07-12 NOTE — ED NOTES
Assumed care of patient. Pt resting in position of comfort. Call bell within reach. Pt placed on monitor x 3. Pt reporting fever, body aches, HA, SOB, CP and leg soreness since yesterday.

## 2025-07-13 LAB
EKG ATRIAL RATE: 117 BPM
EKG DIAGNOSIS: NORMAL
EKG P AXIS: 65 DEGREES
EKG P-R INTERVAL: 154 MS
EKG Q-T INTERVAL: 292 MS
EKG QRS DURATION: 84 MS
EKG QTC CALCULATION (BAZETT): 407 MS
EKG R AXIS: 18 DEGREES
EKG T AXIS: 49 DEGREES
EKG VENTRICULAR RATE: 117 BPM

## 2025-07-13 PROCEDURE — 93010 ELECTROCARDIOGRAM REPORT: CPT | Performed by: INTERNAL MEDICINE
